# Patient Record
Sex: FEMALE | Race: WHITE | Employment: FULL TIME | URBAN - METROPOLITAN AREA
[De-identification: names, ages, dates, MRNs, and addresses within clinical notes are randomized per-mention and may not be internally consistent; named-entity substitution may affect disease eponyms.]

---

## 2017-01-06 ENCOUNTER — OFFICE VISIT (OUTPATIENT)
Dept: INTERNAL MEDICINE CLINIC | Age: 32
End: 2017-01-06

## 2017-01-06 VITALS
BODY MASS INDEX: 42.83 KG/M2 | SYSTOLIC BLOOD PRESSURE: 120 MMHG | WEIGHT: 290 LBS | TEMPERATURE: 98.4 F | DIASTOLIC BLOOD PRESSURE: 62 MMHG

## 2017-01-06 DIAGNOSIS — J06.9 VIRAL URI: ICD-10-CM

## 2017-01-06 PROCEDURE — 99213 OFFICE O/P EST LOW 20 MIN: CPT | Performed by: INTERNAL MEDICINE

## 2017-01-06 RX ORDER — DOXYCYCLINE HYCLATE 100 MG/1
100 CAPSULE ORAL 2 TIMES DAILY
Qty: 20 CAPSULE | Refills: 0 | Status: SHIPPED | OUTPATIENT
Start: 2017-01-06 | End: 2017-01-16

## 2017-03-28 ENCOUNTER — OFFICE VISIT (OUTPATIENT)
Dept: ORTHOPEDIC SURGERY | Age: 32
End: 2017-03-28

## 2017-03-28 VITALS — WEIGHT: 280 LBS | HEIGHT: 70 IN | BODY MASS INDEX: 40.09 KG/M2

## 2017-03-28 DIAGNOSIS — S86.892A SHIN SPLINTS, LEFT, INITIAL ENCOUNTER: ICD-10-CM

## 2017-03-28 DIAGNOSIS — M79.605 LEFT LEG PAIN: Primary | ICD-10-CM

## 2017-03-28 PROCEDURE — 99203 OFFICE O/P NEW LOW 30 MIN: CPT | Performed by: NURSE PRACTITIONER

## 2017-03-28 PROCEDURE — 73590 X-RAY EXAM OF LOWER LEG: CPT | Performed by: NURSE PRACTITIONER

## 2017-04-11 ENCOUNTER — OFFICE VISIT (OUTPATIENT)
Dept: ORTHOPEDIC SURGERY | Age: 32
End: 2017-04-11

## 2017-04-11 VITALS — HEART RATE: 62 BPM | HEIGHT: 70 IN | BODY MASS INDEX: 40.08 KG/M2 | WEIGHT: 279.98 LBS | RESPIRATION RATE: 17 BRPM

## 2017-04-11 DIAGNOSIS — S86.892A SHIN SPLINTS, LEFT, INITIAL ENCOUNTER: Primary | ICD-10-CM

## 2017-04-11 PROBLEM — S86.899A SHIN SPLINTS: Status: ACTIVE | Noted: 2017-04-11

## 2017-04-11 PROCEDURE — 99214 OFFICE O/P EST MOD 30 MIN: CPT | Performed by: ORTHOPAEDIC SURGERY

## 2017-04-13 ENCOUNTER — OFFICE VISIT (OUTPATIENT)
Dept: INTERNAL MEDICINE CLINIC | Age: 32
End: 2017-04-13

## 2017-04-13 VITALS
HEART RATE: 74 BPM | WEIGHT: 272 LBS | DIASTOLIC BLOOD PRESSURE: 69 MMHG | OXYGEN SATURATION: 99 % | BODY MASS INDEX: 38.94 KG/M2 | SYSTOLIC BLOOD PRESSURE: 126 MMHG

## 2017-04-13 DIAGNOSIS — E03.4 HYPOTHYROIDISM DUE TO ACQUIRED ATROPHY OF THYROID: ICD-10-CM

## 2017-04-13 DIAGNOSIS — E78.00 PURE HYPERCHOLESTEROLEMIA: ICD-10-CM

## 2017-04-13 DIAGNOSIS — L40.50 PSORIATIC ARTHRITIS (HCC): Chronic | ICD-10-CM

## 2017-04-13 DIAGNOSIS — J30.2 SEASONAL ALLERGIC RHINITIS, UNSPECIFIED ALLERGIC RHINITIS TRIGGER: Primary | ICD-10-CM

## 2017-04-13 DIAGNOSIS — E66.01 MORBID OBESITY WITH BMI OF 40.0-44.9, ADULT (HCC): ICD-10-CM

## 2017-04-13 DIAGNOSIS — K76.0 FATTY LIVER DISEASE, NONALCOHOLIC: ICD-10-CM

## 2017-04-13 PROBLEM — J06.9 VIRAL URI: Status: RESOLVED | Noted: 2017-01-06 | Resolved: 2017-04-13

## 2017-04-13 LAB
ALBUMIN SERPL-MCNC: 4.5 G/DL (ref 3.4–5)
ALP BLD-CCNC: 53 U/L (ref 40–129)
ALT SERPL-CCNC: 90 U/L (ref 10–40)
ANION GAP SERPL CALCULATED.3IONS-SCNC: 16 MMOL/L (ref 3–16)
AST SERPL-CCNC: 51 U/L (ref 15–37)
BASOPHILS ABSOLUTE: 0 K/UL (ref 0–0.2)
BASOPHILS RELATIVE PERCENT: 0.5 %
BILIRUB SERPL-MCNC: 0.7 MG/DL (ref 0–1)
BILIRUBIN DIRECT: <0.2 MG/DL (ref 0–0.3)
BILIRUBIN, INDIRECT: ABNORMAL MG/DL (ref 0–1)
BUN BLDV-MCNC: 13 MG/DL (ref 7–20)
CALCIUM SERPL-MCNC: 9.5 MG/DL (ref 8.3–10.6)
CHLORIDE BLD-SCNC: 99 MMOL/L (ref 99–110)
CHOLESTEROL, TOTAL: 205 MG/DL (ref 0–199)
CO2: 27 MMOL/L (ref 21–32)
CREAT SERPL-MCNC: 0.9 MG/DL (ref 0.6–1.1)
EOSINOPHILS ABSOLUTE: 0.1 K/UL (ref 0–0.6)
EOSINOPHILS RELATIVE PERCENT: 1.8 %
GFR AFRICAN AMERICAN: >60
GFR NON-AFRICAN AMERICAN: >60
GLUCOSE BLD-MCNC: 84 MG/DL (ref 70–99)
HCT VFR BLD CALC: 41.7 % (ref 36–48)
HDLC SERPL-MCNC: 37 MG/DL (ref 40–60)
HEMOGLOBIN: 14 G/DL (ref 12–16)
LDL CHOLESTEROL CALCULATED: 122 MG/DL
LYMPHOCYTES ABSOLUTE: 3.1 K/UL (ref 1–5.1)
LYMPHOCYTES RELATIVE PERCENT: 49.6 %
MCH RBC QN AUTO: 29.5 PG (ref 26–34)
MCHC RBC AUTO-ENTMCNC: 33.5 G/DL (ref 31–36)
MCV RBC AUTO: 88.1 FL (ref 80–100)
MONOCYTES ABSOLUTE: 0.5 K/UL (ref 0–1.3)
MONOCYTES RELATIVE PERCENT: 7.2 %
NEUTROPHILS ABSOLUTE: 2.6 K/UL (ref 1.7–7.7)
NEUTROPHILS RELATIVE PERCENT: 40.9 %
PDW BLD-RTO: 13.5 % (ref 12.4–15.4)
PLATELET # BLD: 205 K/UL (ref 135–450)
PMV BLD AUTO: 8.9 FL (ref 5–10.5)
POTASSIUM SERPL-SCNC: 4 MMOL/L (ref 3.5–5.1)
RBC # BLD: 4.73 M/UL (ref 4–5.2)
SODIUM BLD-SCNC: 142 MMOL/L (ref 136–145)
T4 FREE: 1.3 NG/DL (ref 0.9–1.8)
TOTAL PROTEIN: 7.4 G/DL (ref 6.4–8.2)
TRIGL SERPL-MCNC: 230 MG/DL (ref 0–150)
TSH SERPL DL<=0.05 MIU/L-ACNC: 4.55 UIU/ML (ref 0.27–4.2)
VLDLC SERPL CALC-MCNC: 46 MG/DL
WBC # BLD: 6.3 K/UL (ref 4–11)

## 2017-04-13 PROCEDURE — 99214 OFFICE O/P EST MOD 30 MIN: CPT | Performed by: INTERNAL MEDICINE

## 2017-04-13 RX ORDER — FEXOFENADINE HCL 180 MG/1
180 TABLET ORAL DAILY
COMMUNITY
Start: 2017-04-13

## 2017-04-18 RX ORDER — CLOBETASOL PROPIONATE 0.46 MG/ML
SOLUTION TOPICAL
Qty: 200 ML | Refills: 6 | Status: SHIPPED | OUTPATIENT
Start: 2017-04-18 | End: 2018-01-22 | Stop reason: SDUPTHER

## 2017-08-30 ENCOUNTER — OFFICE VISIT (OUTPATIENT)
Dept: INTERNAL MEDICINE CLINIC | Age: 32
End: 2017-08-30

## 2017-08-30 VITALS
TEMPERATURE: 98.4 F | SYSTOLIC BLOOD PRESSURE: 121 MMHG | OXYGEN SATURATION: 99 % | DIASTOLIC BLOOD PRESSURE: 72 MMHG | WEIGHT: 293 LBS | BODY MASS INDEX: 41.95 KG/M2 | HEART RATE: 80 BPM

## 2017-08-30 DIAGNOSIS — H66.002 ACUTE SUPPURATIVE OTITIS MEDIA OF LEFT EAR WITHOUT SPONTANEOUS RUPTURE OF TYMPANIC MEMBRANE, RECURRENCE NOT SPECIFIED: ICD-10-CM

## 2017-08-30 DIAGNOSIS — J01.00 ACUTE MAXILLARY SINUSITIS, RECURRENCE NOT SPECIFIED: Primary | ICD-10-CM

## 2017-08-30 PROCEDURE — 99213 OFFICE O/P EST LOW 20 MIN: CPT | Performed by: NURSE PRACTITIONER

## 2017-08-30 RX ORDER — CLARITHROMYCIN 500 MG/1
500 TABLET, COATED ORAL 2 TIMES DAILY
Qty: 20 TABLET | Refills: 0 | Status: SHIPPED | OUTPATIENT
Start: 2017-08-30 | End: 2017-09-09

## 2017-10-24 ENCOUNTER — OFFICE VISIT (OUTPATIENT)
Dept: INTERNAL MEDICINE CLINIC | Age: 32
End: 2017-10-24

## 2017-10-24 VITALS
DIASTOLIC BLOOD PRESSURE: 78 MMHG | HEART RATE: 90 BPM | SYSTOLIC BLOOD PRESSURE: 120 MMHG | OXYGEN SATURATION: 97 % | BODY MASS INDEX: 42.81 KG/M2 | WEIGHT: 293 LBS

## 2017-10-24 DIAGNOSIS — E03.4 HYPOTHYROIDISM DUE TO ACQUIRED ATROPHY OF THYROID: ICD-10-CM

## 2017-10-24 DIAGNOSIS — J30.2 CHRONIC SEASONAL ALLERGIC RHINITIS DUE TO OTHER ALLERGEN: ICD-10-CM

## 2017-10-24 DIAGNOSIS — E66.01 MORBID OBESITY WITH BMI OF 40.0-44.9, ADULT (HCC): ICD-10-CM

## 2017-10-24 DIAGNOSIS — K76.0 FATTY LIVER DISEASE, NONALCOHOLIC: ICD-10-CM

## 2017-10-24 DIAGNOSIS — E78.00 PURE HYPERCHOLESTEROLEMIA: Primary | ICD-10-CM

## 2017-10-24 DIAGNOSIS — L40.50 PSORIATIC ARTHRITIS (HCC): Chronic | ICD-10-CM

## 2017-10-24 PROBLEM — R79.89 ABNORMAL TSH: Status: ACTIVE | Noted: 2017-10-24

## 2017-10-24 PROCEDURE — 99214 OFFICE O/P EST MOD 30 MIN: CPT | Performed by: INTERNAL MEDICINE

## 2017-10-24 PROCEDURE — 90471 IMMUNIZATION ADMIN: CPT | Performed by: INTERNAL MEDICINE

## 2017-10-24 PROCEDURE — 90686 IIV4 VACC NO PRSV 0.5 ML IM: CPT | Performed by: INTERNAL MEDICINE

## 2017-10-24 RX ORDER — ALBUTEROL SULFATE 90 UG/1
2 AEROSOL, METERED RESPIRATORY (INHALATION) EVERY 4 HOURS PRN
Qty: 1 INHALER | Refills: 3 | Status: SHIPPED | OUTPATIENT
Start: 2017-10-24 | End: 2020-02-10 | Stop reason: SDUPTHER

## 2017-10-24 ASSESSMENT — PATIENT HEALTH QUESTIONNAIRE - PHQ9
SUM OF ALL RESPONSES TO PHQ QUESTIONS 1-9: 0
2. FEELING DOWN, DEPRESSED OR HOPELESS: 0
1. LITTLE INTEREST OR PLEASURE IN DOING THINGS: 0
SUM OF ALL RESPONSES TO PHQ9 QUESTIONS 1 & 2: 0

## 2017-10-24 NOTE — PROGRESS NOTES
Vaccine Information Sheet, \"Influenza - Inactivated\"  given to Jory Mcduffie, or parent/legal guardian of  Jory Mcduffie and verbalized understanding. Patient responses:    Have you ever had a reaction to a flu vaccine? No  Are you able to eat eggs without adverse effects? Yes  Do you have any current illness? No  Have you ever had [place] Syndrome? No    Flu vaccine given per order. Please see immunization tab.

## 2017-10-24 NOTE — PROGRESS NOTES
flares Yes Maged Case MD   Calcium Carb-Cholecalciferol (CALTRATE 600+D) 600-800 MG-UNIT TABS Take by mouth Yes Historical Provider, MD   fexofenadine (ALLEGRA ALLERGY) 180 MG tablet Take 1 tablet by mouth daily Yes Kanika Moy MD   clindamycin (CLEOCIN T) 1 % external solution Apply to affected area BID Yes Maged Case MD   clobetasol (TEMOVATE) 0.05 % cream Apply to affected area BID PRN flares Yes Maged Case MD   pimecrolimus (ELIDEL) 1 % cream Apply topically 2 times daily. Yes Maged Case MD   clobetasol (OLUX) 0.05 % foam Apply to the affected area BID PRN flares Yes Maged Case MD   Multiple Vitamin (MULTIVITAMINS PO) Take by mouth Yes Historical Provider, MD   etanercept (ENBREL) 50 MG/ML injection Inject 25 mg into the skin once Yes Historical Provider, MD   meloxicam (MOBIC) 15 MG tablet Take 15 mg by mouth daily. Yes Historical Provider, MD   pseudoephedrine (SUDAFED) 30 MG tablet Take 2 tablets by mouth every 6 hours as needed for Congestion. Yes Kanika Moy MD   montelukast (SINGULAIR) 10 MG tablet Take 1 tablet by mouth nightly  Kanika Moy MD   levothyroxine (SYNTHROID) 50 MCG tablet TAKE ONE TABLET BY MOUTH DAILY  Kanika Moy MD        Vitals:    10/24/17 1400   BP: 120/78   Pulse: 90   SpO2: 97%   Weight: 299 lb (135.6 kg)     Estimated body mass index is 42.81 kg/m² as calculated from the following:    Height as of 4/11/17: 5' 10.08\" (1.78 m). Weight as of this encounter: 299 lb (135.6 kg). Wt Readings from Last 3 Encounters:   10/24/17 299 lb (135.6 kg)   08/30/17 293 lb (132.9 kg)   04/13/17 272 lb (123.4 kg)     BP Readings from Last 3 Encounters:   10/24/17 120/78   08/30/17 121/72   04/13/17 126/69       CC:  Patient presents for follow-up of   1. Pure hypercholesterolemia    2. Hypothyroidism due to acquired atrophy of thyroid    3. Psoriatic arthritis ([facility] [address]    4. Fatty liver disease, nonalcoholic    5.  Morbid obesity with BMI of 40.0-44.9, positive edema, pale, no purulent exudate. Eyes: Conjunctivae are normal.   Neck: No thyroid mass and no thyromegaly present. Cardiovascular: Normal rate, regular rhythm, normal heart sounds, intact distal pulses and normal pulses. Exam reveals no gallop and no friction rub. No murmur heard. Pulmonary/Chest: Effort normal and breath sounds normal. No respiratory distress. She has no wheezes. She has no rhonchi. She has no rales. Musculoskeletal: She exhibits no edema. Lymphadenopathy:     She has no cervical adenopathy. Neurological: She is alert and oriented to person, place, and time. Skin: Skin is warm, dry and intact. No rash noted. No erythema. Psoriatic plaques largely resolved on arms and scalp. Psychiatric: She has a normal mood and affect.  Her speech is normal and behavior is normal. Judgment and thought content normal. Cognition and memory are normal.

## 2017-10-24 NOTE — PATIENT INSTRUCTIONS
Patient Education        Learning About the Mediterranean Diet  What is the [address]? The Mediterranean diet is a style of eating rather than a diet plan. It features foods eaten in [place], [place], [place] and [place], and other countries along the [facility]. It emphasizes eating foods like fish, fruits, vegetables, beans, high-fiber breads and whole grains, nuts, and olive oil. This style of eating includes limited red meat, cheese, and sweets. Why choose the Mediterranean diet? A Mediterranean-style diet may improve heart health. It contains more fat than other heart-healthy diets. But the fats are mainly from nuts, unsaturated oils (such as fish oils and olive oil), and certain nut or seed oils (such as canola, soybean, or flaxseed oil). These fats may help protect the heart and blood vessels. How can you get started on the Mediterranean diet? Here are some things you can do to switch to a more Mediterranean way of eating. What to eat  · Eat a variety of fruits and vegetables each day, such as grapes, blueberries, tomatoes, broccoli, peppers, figs, olives, spinach, eggplant, beans, lentils, and chickpeas. · Eat a variety of whole-grain foods each day, such as oats, brown rice, and whole wheat bread, pasta, and couscous. · Eat fish at least 2 times a week. Try tuna, salmon, mackerel, lake trout, herring, or sardines. · Eat moderate amounts of low-fat dairy products, such as milk, cheese, or yogurt. · Eat moderate amounts of poultry and eggs. · Choose healthy (unsaturated) fats, such as nuts, olive oil, and certain nut or seed oils like canola, soybean, and flaxseed. · Limit unhealthy (saturated) fats, such as butter, palm oil, and coconut oil. And limit fats found in animal products, such as meat and dairy products made with whole milk. Try to eat red meat only a few times a month in very small amounts. · Limit sweets and desserts to only a few times a week.  This includes sugar-sweetened drinks like soda. The Mediterranean diet may also include red wine with your meal1 glass each day for women and up to 2 glasses a day for men. Tips for eating at home  · Use herbs, spices, garlic, lemon zest, and citrus juice instead of salt to add flavor to foods. · Add avocado slices to your sandwich instead of orellana. · Have fish for lunch or dinner instead of red meat. Brush the fish with olive oil, and broil or grill it. · Sprinkle your salad with seeds or nuts instead of cheese. · Cook with olive or canola oil instead of butter or oils that are high in saturated fat. · Switch from 2% milk or whole milk to 1% or fat-free milk. · Dip raw vegetables in a vinaigrette dressing or hummus instead of dips made from mayonnaise or sour cream.  · Have a piece of fruit for dessert instead of a piece of cake. Try baked apples, or have some dried fruit. Tips for eating out  · Try broiled, grilled, baked, or poached fish instead of having it fried or breaded. · Ask your  to have your meals prepared with olive oil instead of butter. · Order dishes made with marinara sauce or sauces made from olive oil. Avoid sauces made from cream or mayonnaise. · Choose whole-grain breads, whole wheat pasta and pizza crust, brown rice, beans, and lentils. · Cut back on butter or margarine on bread. Instead, you can dip your bread in a small amount of olive oil. · Ask for a side salad or grilled vegetables instead of french fries or chips. Where can you learn more? Go to https://[facility]. org and sign in to your [employer] account. Enter [phone] in the [facility] box to learn more about \"Learning About the Mediterranean Diet. \"     If you do not have an account, please click on the \"Sign Up Now\" link. Current as of: December 29, 2016  Content Version: 11.3  © 1344-6914 [employer], [employer]. Care instructions adapted under license by [facility] ([facility]).  If you have questions about

## 2017-10-27 DIAGNOSIS — K76.0 FATTY LIVER DISEASE, NONALCOHOLIC: ICD-10-CM

## 2017-10-27 DIAGNOSIS — L40.50 PSORIATIC ARTHRITIS (HCC): Chronic | ICD-10-CM

## 2017-10-27 DIAGNOSIS — E78.00 PURE HYPERCHOLESTEROLEMIA: ICD-10-CM

## 2017-10-27 DIAGNOSIS — E03.4 HYPOTHYROIDISM DUE TO ACQUIRED ATROPHY OF THYROID: ICD-10-CM

## 2017-10-27 LAB
ALBUMIN SERPL-MCNC: 4.1 G/DL (ref 3.4–5)
ALP BLD-CCNC: 56 U/L (ref 40–129)
ALT SERPL-CCNC: 58 U/L (ref 10–40)
ANION GAP SERPL CALCULATED.3IONS-SCNC: 13 MMOL/L (ref 3–16)
AST SERPL-CCNC: 35 U/L (ref 15–37)
BASOPHILS ABSOLUTE: 0 K/UL (ref 0–0.2)
BASOPHILS RELATIVE PERCENT: 0.4 %
BILIRUB SERPL-MCNC: 0.4 MG/DL (ref 0–1)
BILIRUBIN DIRECT: <0.2 MG/DL (ref 0–0.3)
BILIRUBIN, INDIRECT: ABNORMAL MG/DL (ref 0–1)
BUN BLDV-MCNC: 11 MG/DL (ref 7–20)
CALCIUM SERPL-MCNC: 9.1 MG/DL (ref 8.3–10.6)
CHLORIDE BLD-SCNC: 100 MMOL/L (ref 99–110)
CHOLESTEROL, FASTING: 198 MG/DL (ref 0–199)
CO2: 26 MMOL/L (ref 21–32)
CREAT SERPL-MCNC: 0.6 MG/DL (ref 0.6–1.1)
EOSINOPHILS ABSOLUTE: 0.1 K/UL (ref 0–0.6)
EOSINOPHILS RELATIVE PERCENT: 2.3 %
GFR AFRICAN AMERICAN: >60
GFR NON-AFRICAN AMERICAN: >60
GLUCOSE FASTING: 90 MG/DL (ref 70–99)
HCT VFR BLD CALC: 42 % (ref 36–48)
HDLC SERPL-MCNC: 43 MG/DL (ref 40–60)
HEMOGLOBIN: 14 G/DL (ref 12–16)
LDL CHOLESTEROL CALCULATED: 124 MG/DL
LYMPHOCYTES ABSOLUTE: 2.1 K/UL (ref 1–5.1)
LYMPHOCYTES RELATIVE PERCENT: 36.2 %
MCH RBC QN AUTO: 29.4 PG (ref 26–34)
MCHC RBC AUTO-ENTMCNC: 33.4 G/DL (ref 31–36)
MCV RBC AUTO: 88.1 FL (ref 80–100)
MONOCYTES ABSOLUTE: 0.4 K/UL (ref 0–1.3)
MONOCYTES RELATIVE PERCENT: 7.9 %
NEUTROPHILS ABSOLUTE: 3 K/UL (ref 1.7–7.7)
NEUTROPHILS RELATIVE PERCENT: 53.2 %
PDW BLD-RTO: 13.5 % (ref 12.4–15.4)
PLATELET # BLD: 197 K/UL (ref 135–450)
PMV BLD AUTO: 8.4 FL (ref 5–10.5)
POTASSIUM SERPL-SCNC: 4.5 MMOL/L (ref 3.5–5.1)
RBC # BLD: 4.77 M/UL (ref 4–5.2)
SODIUM BLD-SCNC: 139 MMOL/L (ref 136–145)
TOTAL PROTEIN: 7.3 G/DL (ref 6.4–8.2)
TRIGLYCERIDE, FASTING: 154 MG/DL (ref 0–150)
TSH REFLEX: 2.13 UIU/ML (ref 0.27–4.2)
VLDLC SERPL CALC-MCNC: 31 MG/DL
WBC # BLD: 5.7 K/UL (ref 4–11)

## 2018-01-03 ENCOUNTER — OFFICE VISIT (OUTPATIENT)
Dept: DERMATOLOGY | Age: 33
End: 2018-01-03

## 2018-01-03 DIAGNOSIS — L98.0 PYOGENIC GRANULOMA: Primary | ICD-10-CM

## 2018-01-03 PROCEDURE — 11306 SHAVE SKIN LESION 0.6-1.0 CM: CPT | Performed by: DERMATOLOGY

## 2018-01-03 NOTE — PROGRESS NOTES
sent to pathology. Pt educated re: risk of bleeding, infection, scar and wound care instructions. Dressing applied.   Discussed high risk of recurrence-recheck in the next month

## 2018-01-09 ENCOUNTER — TELEPHONE (OUTPATIENT)
Dept: DERMATOLOGY | Age: 33
End: 2018-01-09

## 2018-01-22 ENCOUNTER — OFFICE VISIT (OUTPATIENT)
Dept: DERMATOLOGY | Age: 33
End: 2018-01-22

## 2018-01-22 DIAGNOSIS — L98.0 PYOGENIC GRANULOMA: Primary | ICD-10-CM

## 2018-01-22 PROCEDURE — 11305 SHAVE SKIN LESION 0.5 CM/<: CPT | Performed by: DERMATOLOGY

## 2018-01-23 RX ORDER — CLOBETASOL PROPIONATE 0.46 MG/ML
SOLUTION TOPICAL
Qty: 100 ML | Refills: 5 | Status: SHIPPED | OUTPATIENT
Start: 2018-01-23

## 2018-01-23 RX ORDER — CLOBETASOL PROPIONATE 0.5 MG/G
AEROSOL, FOAM TOPICAL
Qty: 100 G | Refills: 4 | Status: SHIPPED | OUTPATIENT
Start: 2018-01-23

## 2018-01-25 ENCOUNTER — TELEPHONE (OUTPATIENT)
Dept: DERMATOLOGY | Age: 33
End: 2018-01-25

## 2018-02-12 ENCOUNTER — OFFICE VISIT (OUTPATIENT)
Dept: DERMATOLOGY | Age: 33
End: 2018-02-12

## 2018-02-12 DIAGNOSIS — L98.0 PYOGENIC GRANULOMA: Primary | ICD-10-CM

## 2018-02-12 PROCEDURE — 11306 SHAVE SKIN LESION 0.6-1.0 CM: CPT | Performed by: DERMATOLOGY

## 2018-02-12 NOTE — PROGRESS NOTES
[address] [place] [address] Dermatology  Eustaquio Lombard, M.D.  [phone]       Dipika Marshall  1985    27 y.o. female     Date of Visit: 2/12/2018    Chief Complaint:   Chief Complaint   Patient presents with    Skin Lesion     left hand index finger        I was asked to see this patient by Dr. Carter ref. provider found. History of Present Illness:  1. Patient presented today for follow-up of a pyogenic granuloma left index finger, palmar surface. Lesion is been removed twice previously. Lesion much improved but she notices a few areas of erythema and that it remains papular. She notes a high co-pay every time she comes to the office. Review of Systems:  Constitutional: Reports general sense of well-being       Past Medical History, Surgical History, Family History, Medications and Allergies reviewed. Social History:   Social History     Social History    Marital status: Single     Spouse name: N/A    Number of children: 0    Years of education: N/A     Occupational History    Student      Social History Main Topics    Smoking status: Never Smoker    Smokeless tobacco: Never Used    Alcohol use 2.4 oz/week     2 Glasses of wine, 2 Shots of liquor per week    Drug use: No    Sexual activity: Not Currently     Other Topics Concern    Not on file     Social History Narrative    No narrative on file       Physical Examination       -General: Well-appearing, NAD  7 mm raised slightly scaly erythematous papule left palmar surface of index finger          Assessment and Plan     1. Pyogenic granuloma -Left palmar surface of index finger -Verbal consent obtained after risks (infection, bleeding, scar), benefits and alternatives explained. -Area(s) to be shaved were marked with a surgical pen. Site(s) were cleansed with alcohol. Local anesthesia achieved with 1% lidocaine with epinephrine/sodium bicarbonate. Shave lesion performed with a razor blade.  Hemostasis was achieved with aluminum chloride and

## 2018-02-15 ENCOUNTER — TELEPHONE (OUTPATIENT)
Dept: DERMATOLOGY | Age: 33
End: 2018-02-15

## 2018-02-15 NOTE — TELEPHONE ENCOUNTER
Called and LM on  with pathology results. (OK per HIPAA)      Date of biopsy: 02/12/2018  Site of biopsy: L index finger  Result: Pyogenic granuloma    Plan: No further treatment needed.

## 2018-03-12 ENCOUNTER — TELEPHONE (OUTPATIENT)
Dept: DERMATOLOGY | Age: 33
End: 2018-03-12

## 2018-03-12 NOTE — TELEPHONE ENCOUNTER
Pt calling states she is still having some issues with her hands wants to know if  will refer her to hand surgeon pls call pt back to discuss @ [phone] thank you

## 2018-04-24 ENCOUNTER — OFFICE VISIT (OUTPATIENT)
Dept: INTERNAL MEDICINE CLINIC | Age: 33
End: 2018-04-24

## 2018-04-24 VITALS
DIASTOLIC BLOOD PRESSURE: 78 MMHG | SYSTOLIC BLOOD PRESSURE: 126 MMHG | HEIGHT: 70 IN | WEIGHT: 293 LBS | BODY MASS INDEX: 41.95 KG/M2 | HEART RATE: 80 BPM

## 2018-04-24 DIAGNOSIS — K76.0 FATTY LIVER DISEASE, NONALCOHOLIC: ICD-10-CM

## 2018-04-24 DIAGNOSIS — L40.50 PSORIATIC ARTHRITIS (HCC): Chronic | ICD-10-CM

## 2018-04-24 DIAGNOSIS — J30.2 CHRONIC SEASONAL ALLERGIC RHINITIS DUE TO OTHER ALLERGEN: ICD-10-CM

## 2018-04-24 DIAGNOSIS — E03.4 HYPOTHYROIDISM DUE TO ACQUIRED ATROPHY OF THYROID: ICD-10-CM

## 2018-04-24 DIAGNOSIS — E66.01 MORBID OBESITY WITH BMI OF 40.0-44.9, ADULT (HCC): ICD-10-CM

## 2018-04-24 DIAGNOSIS — E78.00 PURE HYPERCHOLESTEROLEMIA: Primary | ICD-10-CM

## 2018-04-24 PROCEDURE — 99214 OFFICE O/P EST MOD 30 MIN: CPT | Performed by: INTERNAL MEDICINE

## 2018-06-20 RX ORDER — CLINDAMYCIN PHOSPHATE 11.9 MG/ML
SOLUTION TOPICAL
Qty: 60 ML | Refills: 5 | Status: SHIPPED | OUTPATIENT
Start: 2018-06-20

## 2018-07-23 ENCOUNTER — PATIENT MESSAGE (OUTPATIENT)
Dept: INTERNAL MEDICINE CLINIC | Age: 33
End: 2018-07-23

## 2018-08-29 ENCOUNTER — APPOINTMENT (OUTPATIENT)
Dept: CT IMAGING | Age: 33
End: 2018-08-29
Payer: COMMERCIAL

## 2018-08-29 ENCOUNTER — HOSPITAL ENCOUNTER (EMERGENCY)
Age: 33
Discharge: HOME | End: 2018-08-29
Attending: EMERGENCY MEDICINE
Payer: COMMERCIAL

## 2018-08-29 VITALS
OXYGEN SATURATION: 100 % | TEMPERATURE: 98.5 F | HEART RATE: 61 BPM | BODY MASS INDEX: 43.4 KG/M2 | RESPIRATION RATE: 16 BRPM | SYSTOLIC BLOOD PRESSURE: 139 MMHG | HEIGHT: 69 IN | WEIGHT: 293 LBS | DIASTOLIC BLOOD PRESSURE: 83 MMHG

## 2018-08-29 DIAGNOSIS — R10.12 LEFT UPPER QUADRANT PAIN: Primary | ICD-10-CM

## 2018-08-29 LAB
A/G RATIO: 1.5 (ref 1.1–2.2)
ALBUMIN SERPL-MCNC: 4.4 G/DL (ref 3.4–5)
ALP BLD-CCNC: 52 U/L (ref 40–129)
ALT SERPL-CCNC: 69 U/L (ref 10–40)
ANION GAP SERPL CALCULATED.3IONS-SCNC: 11 MMOL/L (ref 3–16)
AST SERPL-CCNC: 43 U/L (ref 15–37)
BACTERIA: ABNORMAL /HPF
BASOPHILS ABSOLUTE: 0 K/UL (ref 0–0.2)
BASOPHILS RELATIVE PERCENT: 0.5 %
BILIRUB SERPL-MCNC: 0.5 MG/DL (ref 0–1)
BILIRUBIN URINE: NEGATIVE
BLOOD, URINE: ABNORMAL
BUN BLDV-MCNC: 13 MG/DL (ref 7–20)
CALCIUM SERPL-MCNC: 9.2 MG/DL (ref 8.3–10.6)
CHLORIDE BLD-SCNC: 103 MMOL/L (ref 99–110)
CLARITY: CLEAR
CO2: 25 MMOL/L (ref 21–32)
COLOR: YELLOW
CREAT SERPL-MCNC: 0.7 MG/DL (ref 0.6–1.1)
EOSINOPHILS ABSOLUTE: 0.1 K/UL (ref 0–0.6)
EOSINOPHILS RELATIVE PERCENT: 2.2 %
EPITHELIAL CELLS, UA: ABNORMAL /HPF
GFR AFRICAN AMERICAN: >60
GFR NON-AFRICAN AMERICAN: >60
GLOBULIN: 2.9 G/DL
GLUCOSE BLD-MCNC: 93 MG/DL (ref 70–99)
GLUCOSE URINE: NEGATIVE MG/DL
HCT VFR BLD CALC: 39.7 % (ref 36–48)
HEMOGLOBIN: 13.7 G/DL (ref 12–16)
KETONES, URINE: NEGATIVE MG/DL
LEUKOCYTE ESTERASE, URINE: NEGATIVE
LIPASE: 27 U/L (ref 13–60)
LYMPHOCYTES ABSOLUTE: 2.2 K/UL (ref 1–5.1)
LYMPHOCYTES RELATIVE PERCENT: 41.7 %
MCH RBC QN AUTO: 29.5 PG (ref 26–34)
MCHC RBC AUTO-ENTMCNC: 34.4 G/DL (ref 31–36)
MCV RBC AUTO: 86 FL (ref 80–100)
MICROSCOPIC EXAMINATION: YES
MONOCYTES ABSOLUTE: 0.4 K/UL (ref 0–1.3)
MONOCYTES RELATIVE PERCENT: 7.2 %
NEUTROPHILS ABSOLUTE: 2.6 K/UL (ref 1.7–7.7)
NEUTROPHILS RELATIVE PERCENT: 48.4 %
NITRITE, URINE: NEGATIVE
PDW BLD-RTO: 13.5 % (ref 12.4–15.4)
PH UA: 6
PLATELET # BLD: 196 K/UL (ref 135–450)
PMV BLD AUTO: 7.8 FL (ref 5–10.5)
POTASSIUM REFLEX MAGNESIUM: 3.9 MMOL/L (ref 3.5–5.1)
PREGNANCY, URINE: NEGATIVE
PROTEIN UA: NEGATIVE MG/DL
RBC # BLD: 4.62 M/UL (ref 4–5.2)
RBC UA: ABNORMAL /HPF (ref 0–2)
SODIUM BLD-SCNC: 139 MMOL/L (ref 136–145)
SPECIFIC GRAVITY UA: >=1.03
TOTAL PROTEIN: 7.3 G/DL (ref 6.4–8.2)
URINE TYPE: ABNORMAL
UROBILINOGEN, URINE: 0.2 E.U./DL
WBC # BLD: 5.3 K/UL (ref 4–11)
WBC UA: ABNORMAL /HPF (ref 0–5)

## 2018-08-29 PROCEDURE — 6360000002 HC RX W HCPCS: Performed by: PHYSICIAN ASSISTANT

## 2018-08-29 PROCEDURE — 84703 CHORIONIC GONADOTROPIN ASSAY: CPT

## 2018-08-29 PROCEDURE — 74177 CT ABD & PELVIS W/CONTRAST: CPT

## 2018-08-29 PROCEDURE — 85025 COMPLETE CBC W/AUTO DIFF WBC: CPT

## 2018-08-29 PROCEDURE — 81001 URINALYSIS AUTO W/SCOPE: CPT

## 2018-08-29 PROCEDURE — 96375 TX/PRO/DX INJ NEW DRUG ADDON: CPT

## 2018-08-29 PROCEDURE — 6360000004 HC RX CONTRAST MEDICATION: Performed by: EMERGENCY MEDICINE

## 2018-08-29 PROCEDURE — 6360000002 HC RX W HCPCS: Performed by: EMERGENCY MEDICINE

## 2018-08-29 PROCEDURE — 96374 THER/PROPH/DIAG INJ IV PUSH: CPT

## 2018-08-29 PROCEDURE — 80053 COMPREHEN METABOLIC PANEL: CPT

## 2018-08-29 PROCEDURE — 99284 EMERGENCY DEPT VISIT MOD MDM: CPT

## 2018-08-29 PROCEDURE — 83690 ASSAY OF LIPASE: CPT

## 2018-08-29 RX ORDER — ONDANSETRON 2 MG/ML
4 INJECTION INTRAMUSCULAR; INTRAVENOUS ONCE
Status: COMPLETED | OUTPATIENT
Start: 2018-08-29 | End: 2018-08-29

## 2018-08-29 RX ORDER — OXYCODONE HYDROCHLORIDE 5 MG/1
5 TABLET ORAL ONCE
Status: DISCONTINUED | OUTPATIENT
Start: 2018-08-29 | End: 2018-08-29 | Stop reason: HOSPADM

## 2018-08-29 RX ORDER — KETOROLAC TROMETHAMINE 30 MG/ML
15 INJECTION, SOLUTION INTRAMUSCULAR; INTRAVENOUS ONCE
Status: COMPLETED | OUTPATIENT
Start: 2018-08-29 | End: 2018-08-29

## 2018-08-29 RX ADMIN — KETOROLAC TROMETHAMINE 15 MG: 30 INJECTION, SOLUTION INTRAMUSCULAR at 18:35

## 2018-08-29 RX ADMIN — IOPAMIDOL 80 ML: 755 INJECTION, SOLUTION INTRAVENOUS at 18:52

## 2018-08-29 RX ADMIN — ONDANSETRON 4 MG: 2 INJECTION INTRAMUSCULAR; INTRAVENOUS at 18:35

## 2018-08-29 ASSESSMENT — ENCOUNTER SYMPTOMS
ABDOMINAL PAIN: 1
WHEEZING: 0
NAUSEA: 1
SHORTNESS OF BREATH: 0
COUGH: 0
VOMITING: 0
SORE THROAT: 0

## 2018-08-29 ASSESSMENT — PAIN SCALES - GENERAL
PAINLEVEL_OUTOF10: 4
PAINLEVEL_OUTOF10: 7

## 2018-08-29 ASSESSMENT — PAIN DESCRIPTION - LOCATION: LOCATION: ABDOMEN

## 2018-08-29 ASSESSMENT — PAIN DESCRIPTION - ORIENTATION: ORIENTATION: LEFT;UPPER

## 2018-08-29 ASSESSMENT — PAIN DESCRIPTION - PAIN TYPE: TYPE: ACUTE PAIN

## 2018-08-29 ASSESSMENT — PAIN DESCRIPTION - DESCRIPTORS: DESCRIPTORS: ACHING;CONSTANT

## 2018-08-29 NOTE — ED PROVIDER NOTES
ED Attending Attestation Note     Date of evaluation: 8/29/2018    This patient was seen by the advance practice provider. I have seen and examined the patient, agree with the workup, evaluation, management and diagnosis. The care plan has been discussed. My assessment reveals here with left sided abd pain associated with nausea. Denies any fever chills or sweats. Denies any vomiting or diarrhea. On exam mild tenderness left abdomen at the level umbilicus but no true rebound or guarding.      Mina Corbett MD  08/29/18 0497

## 2018-08-29 NOTE — ED PROVIDER NOTES
[address] ENCOUNTER          PHYSICIAN ASSISTANT NOTE       Date of evaluation: 8/29/2018    Chief Complaint     Abdominal Pain (left upper abd pain/pt reports it gets worse after she eats/ +N)      History of Present Illness     Eladia Seay is a 26 y.o. female with a history of polycystic ovarian syndrome and obesity presents today with 4 days of episodic left upper quadrant abdominal pain. Patient describes a sharp burning pain in her left upper quadrant. At his current moment is 3/10 but at its worst at 7/10. It worsens with meals and specific movements of her trunk. She had 3 alcoholic beverages the evening before her pain started. She denies any new medication changes besides her recent fiber supplement. She endorses a single bout of nausea but has not vomited. No changes in her bowel habits. Last menstrual appears approximately 4 days ago and was normal for her. No shortness of breath, cough, or hemoptysis. Review of Systems     Review of Systems   Constitutional: Negative for chills and fever. HENT: Negative for sore throat. Eyes: Negative for visual disturbance. Respiratory: Negative for cough, shortness of breath and wheezing. Cardiovascular: Negative for chest pain and palpitations. Gastrointestinal: Positive for abdominal pain and nausea. Negative for vomiting. Musculoskeletal: Negative for gait problem. Skin: Negative for rash. Neurological: Negative for dizziness and headaches. Past Medical, Surgical, Family, and Social History     She has a past medical history of Depression; Dysmenorrhea; Herniated lumbar intervertebral disc; Hormone disorder; Psoriatic arthritis ([address] Seasonal allergic rhinitis; and Thyroid disease. She has a past surgical history that includes [place] tooth extraction and Foot neuroma surgery (2007). Her family history includes Coronary Art Dis in her maternal grandfather; Diabetes in her sister;  No Known Problems in her brother, father, maternal aunt, maternal grandmother, maternal uncle, mother, paternal aunt, paternal grandfather, paternal grandmother, and another family member; Other in her paternal uncle. She reports that she has never smoked. She has never used smokeless tobacco. She reports that she drinks about 2.4 oz of alcohol per week . She reports that she does not use drugs. Medications     Discharge Medication List as of 8/29/2018  7:46 PM      CONTINUE these medications which have NOT CHANGED    Details   clindamycin (CLEOCIN T) 1 % external solution Apply to affected area BID, Disp-60 mL, R-5, Normal      clobetasol (OLUX) 0.05 % foam APPLY TO AFFECTED AREA TWO TIMES A DAY AS NEEDED FOR FLARES, Disp-100 g, R-4, Normal      clobetasol (TEMOVATE) 0.05 % external solution APPLY TO THE AFFECTED AREA TWO TIMES A DAY AS NEEDED FOR FLARES, Disp-100 mL, R-5, Normal      albuterol sulfate HFA (PROAIR HFA) 108 (90 Base) MCG/ACT inhaler Inhale 2 puffs into the lungs every 4 hours as needed for Wheezing, Disp-1 Inhaler, R-3Normal      fluticasone (FLONASE SENSIMIST) 27.5 MCG/SPRAY nasal spray 2 sprays by Nasal route dailyHistorical Med      Calcium Carb-Cholecalciferol (CALTRATE 600+D) 600-800 MG-UNIT TABS Take by mouthHistorical Med      fexofenadine (ALLEGRA ALLERGY) 180 MG tablet Take 1 tablet by mouth dailyOTC      clobetasol (TEMOVATE) 0.05 % cream Apply to affected area BID PRN flares, Disp-60 g, R-6, Normal      pimecrolimus (ELIDEL) 1 % cream Apply topically 2 times daily. , Disp-1 Bottle, R-6, Normal      levothyroxine (SYNTHROID) 50 MCG tablet TAKE ONE TABLET BY MOUTH DAILY, Disp-30 tablet, R-5      Multiple Vitamin (MULTIVITAMINS PO) Take by mouth      etanercept (ENBREL) 50 MG/ML injection Inject 25 mg into the skin once      meloxicam (MOBIC) 15 MG tablet Take 15 mg by mouth daily. pseudoephedrine (SUDAFED) 30 MG tablet Take 2 tablets by mouth every 6 hours as needed for Congestion.

## 2018-09-11 ENCOUNTER — OFFICE VISIT (OUTPATIENT)
Dept: INTERNAL MEDICINE CLINIC | Age: 33
End: 2018-09-11

## 2018-09-11 VITALS
DIASTOLIC BLOOD PRESSURE: 74 MMHG | SYSTOLIC BLOOD PRESSURE: 122 MMHG | BODY MASS INDEX: 45.19 KG/M2 | WEIGHT: 293 LBS | TEMPERATURE: 98.8 F

## 2018-09-11 DIAGNOSIS — R35.0 URINARY FREQUENCY: Primary | ICD-10-CM

## 2018-09-11 DIAGNOSIS — R10.13 EPIGASTRIC PAIN: ICD-10-CM

## 2018-09-11 LAB
BILIRUBIN, POC: ABNORMAL
BLOOD URINE, POC: ABNORMAL
CLARITY, POC: CLEAR
COLOR, POC: CLEAR
GLUCOSE URINE, POC: ABNORMAL
KETONES, POC: ABNORMAL
LEUKOCYTE EST, POC: ABNORMAL
NITRITE, POC: ABNORMAL
PH, POC: 6
PROTEIN, POC: ABNORMAL
SPECIFIC GRAVITY, POC: 1.02
UROBILINOGEN, POC: ABNORMAL

## 2018-09-11 PROCEDURE — 99214 OFFICE O/P EST MOD 30 MIN: CPT | Performed by: INTERNAL MEDICINE

## 2018-09-11 PROCEDURE — 81002 URINALYSIS NONAUTO W/O SCOPE: CPT | Performed by: INTERNAL MEDICINE

## 2018-09-11 RX ORDER — PHENAZOPYRIDINE HYDROCHLORIDE 200 MG/1
200 TABLET, FILM COATED ORAL 3 TIMES DAILY PRN
Qty: 15 TABLET | Refills: 0 | Status: SHIPPED | OUTPATIENT
Start: 2018-09-11 | End: 2018-09-14

## 2018-09-13 LAB — URINE CULTURE, ROUTINE: NORMAL

## 2018-10-01 ENCOUNTER — TELEPHONE (OUTPATIENT)
Dept: INTERNAL MEDICINE CLINIC | Age: 33
End: 2018-10-01

## 2018-10-05 ENCOUNTER — OFFICE VISIT (OUTPATIENT)
Dept: INTERNAL MEDICINE CLINIC | Age: 33
End: 2018-10-05
Payer: COMMERCIAL

## 2018-10-05 VITALS
WEIGHT: 293 LBS | HEART RATE: 64 BPM | RESPIRATION RATE: 16 BRPM | SYSTOLIC BLOOD PRESSURE: 120 MMHG | TEMPERATURE: 97.6 F | DIASTOLIC BLOOD PRESSURE: 60 MMHG | BODY MASS INDEX: 43.71 KG/M2

## 2018-10-05 DIAGNOSIS — L40.50 PSORIATIC ARTHRITIS (HCC): Chronic | ICD-10-CM

## 2018-10-05 DIAGNOSIS — R53.83 FATIGUE, UNSPECIFIED TYPE: ICD-10-CM

## 2018-10-05 DIAGNOSIS — R68.83 CHILLS: ICD-10-CM

## 2018-10-05 DIAGNOSIS — J02.8 PHARYNGITIS DUE TO OTHER ORGANISM: ICD-10-CM

## 2018-10-05 DIAGNOSIS — R10.13 EPIGASTRIC PAIN: Primary | ICD-10-CM

## 2018-10-05 DIAGNOSIS — R10.13 EPIGASTRIC PAIN: ICD-10-CM

## 2018-10-05 LAB
A/G RATIO: 1.6 (ref 1.1–2.2)
ALBUMIN SERPL-MCNC: 4.5 G/DL (ref 3.4–5)
ALP BLD-CCNC: 65 U/L (ref 40–129)
ALT SERPL-CCNC: 72 U/L (ref 10–40)
ANION GAP SERPL CALCULATED.3IONS-SCNC: 13 MMOL/L (ref 3–16)
AST SERPL-CCNC: 46 U/L (ref 15–37)
BASOPHILS ABSOLUTE: 0 K/UL (ref 0–0.2)
BASOPHILS RELATIVE PERCENT: 0.6 %
BILIRUB SERPL-MCNC: 0.6 MG/DL (ref 0–1)
BUN BLDV-MCNC: 9 MG/DL (ref 7–20)
C-REACTIVE PROTEIN: 0.5 MG/L (ref 0–5.1)
CALCIUM SERPL-MCNC: 9.4 MG/DL (ref 8.3–10.6)
CHLORIDE BLD-SCNC: 100 MMOL/L (ref 99–110)
CO2: 25 MMOL/L (ref 21–32)
CREAT SERPL-MCNC: 0.8 MG/DL (ref 0.6–1.1)
EOSINOPHILS ABSOLUTE: 0.1 K/UL (ref 0–0.6)
EOSINOPHILS RELATIVE PERCENT: 2 %
GFR AFRICAN AMERICAN: >60
GFR NON-AFRICAN AMERICAN: >60
GLOBULIN: 2.9 G/DL
GLUCOSE BLD-MCNC: 84 MG/DL (ref 70–99)
HCT VFR BLD CALC: 43.3 % (ref 36–48)
HEMOGLOBIN: 14.5 G/DL (ref 12–16)
LYMPHOCYTES ABSOLUTE: 2.1 K/UL (ref 1–5.1)
LYMPHOCYTES RELATIVE PERCENT: 42.6 %
MCH RBC QN AUTO: 29.6 PG (ref 26–34)
MCHC RBC AUTO-ENTMCNC: 33.6 G/DL (ref 31–36)
MCV RBC AUTO: 88.1 FL (ref 80–100)
MONOCYTES ABSOLUTE: 0.3 K/UL (ref 0–1.3)
MONOCYTES RELATIVE PERCENT: 6.1 %
NEUTROPHILS ABSOLUTE: 2.5 K/UL (ref 1.7–7.7)
NEUTROPHILS RELATIVE PERCENT: 48.7 %
PDW BLD-RTO: 13.4 % (ref 12.4–15.4)
PLATELET # BLD: 211 K/UL (ref 135–450)
PMV BLD AUTO: 9.6 FL (ref 5–10.5)
POTASSIUM SERPL-SCNC: 4.4 MMOL/L (ref 3.5–5.1)
RBC # BLD: 4.92 M/UL (ref 4–5.2)
SEDIMENTATION RATE, ERYTHROCYTE: 9 MM/HR (ref 0–20)
SODIUM BLD-SCNC: 138 MMOL/L (ref 136–145)
TOTAL PROTEIN: 7.4 G/DL (ref 6.4–8.2)
WBC # BLD: 5 K/UL (ref 4–11)

## 2018-10-05 PROCEDURE — 99214 OFFICE O/P EST MOD 30 MIN: CPT | Performed by: INTERNAL MEDICINE

## 2018-10-05 ASSESSMENT — PATIENT HEALTH QUESTIONNAIRE - PHQ9
1. LITTLE INTEREST OR PLEASURE IN DOING THINGS: 0
SUM OF ALL RESPONSES TO PHQ9 QUESTIONS 1 & 2: 0
SUM OF ALL RESPONSES TO PHQ QUESTIONS 1-9: 0
2. FEELING DOWN, DEPRESSED OR HOPELESS: 0
SUM OF ALL RESPONSES TO PHQ QUESTIONS 1-9: 0

## 2018-10-05 NOTE — PROGRESS NOTES
HFA) 108 (90 Base) MCG/ACT inhaler Inhale 2 puffs into the lungs every 4 hours as needed for Wheezing 1 Inhaler 3    fluticasone (FLONASE SENSIMIST) 27.5 MCG/SPRAY nasal spray 2 sprays by Nasal route daily      fexofenadine (ALLEGRA ALLERGY) 180 MG tablet Take 1 tablet by mouth daily      clobetasol (TEMOVATE) 0.05 % cream Apply to affected area BID PRN flares 60 g 6    pimecrolimus (ELIDEL) 1 % cream Apply topically 2 times daily. 1 Bottle 6    Multiple Vitamin (MULTIVITAMINS PO) Take by mouth      etanercept (ENBREL) 50 MG/ML injection Inject 25 mg into the skin once      meloxicam (MOBIC) 15 MG tablet Take 15 mg by mouth daily.  Calcium Carb-Cholecalciferol (CALTRATE 600+D) 600-800 MG-UNIT TABS Take by mouth      pseudoephedrine (SUDAFED) 30 MG tablet Take 2 tablets by mouth every 6 hours as needed for Congestion. No current facility-administered medications on file prior to visit.         Past Medical History:   Diagnosis Date    Depression     Dysmenorrhea     Herniated lumbar intervertebral disc     L3, L5-S1 with impingement    Hormone disorder     P[facility]iatic arthritis ([facility] [address]     Seasonal allergic rhinitis 10/13/2016    Thyroid disease        Past Surgical History:   Procedure Laterality Date    FOOT NEUROMA SURGERY  2007    Right  x2    WISDOM TOOTH EXTRACTION         Social History   Substance Use Topics    Smoking status: Never Smoker    Smokeless tobacco: Never Used    Alcohol use 2.4 oz/week     2 Glasses of wine, 2 Shots of liquor per week      Comment: social       Family History   Problem Relation Age of Onset    Diabetes Sister         Type I    Coronary Art Dis Maternal Grandfather         MI- 66's    Other Paternal Uncle         Aortic dissection, possible Marfan's    No Known Problems Mother     No Known Problems Father     No Known Problems Brother     No Known Problems Maternal Aunt     No Known Problems Maternal Uncle     No Known Problems Paternal Aunt    

## 2018-10-05 NOTE — LETTER
June Jang DO  [id number] [facility], [address]  Ph: [phone]    Re:     Carmelita Arriaga     To whom this may concern, please note that Carmelita Arriaga was evaluated in my office 10/5/2018. Please excuse the patient from work/school from 10/5/2018 to 10/5/2018. Any questions please feel free to call my office. Thank you,         Curry Mauricio.  Nannette Llanes DO

## 2018-10-06 LAB
ESTIMATED AVERAGE GLUCOSE: 99.7 MG/DL
HBA1C MFR BLD: 5.1 %

## 2018-10-09 ENCOUNTER — TELEPHONE (OUTPATIENT)
Dept: INTERNAL MEDICINE CLINIC | Age: 33
End: 2018-10-09

## 2018-10-09 ENCOUNTER — APPOINTMENT (OUTPATIENT)
Dept: GENERAL RADIOLOGY | Age: 33
End: 2018-10-09
Payer: COMMERCIAL

## 2018-10-09 ENCOUNTER — HOSPITAL ENCOUNTER (EMERGENCY)
Age: 33
Discharge: HOME | End: 2018-10-09
Attending: EMERGENCY MEDICINE
Payer: COMMERCIAL

## 2018-10-09 ENCOUNTER — HOSPITAL ENCOUNTER (OUTPATIENT)
Dept: ULTRASOUND IMAGING | Age: 33
Discharge: HOME | End: 2018-10-09
Payer: COMMERCIAL

## 2018-10-09 VITALS
OXYGEN SATURATION: 95 % | HEART RATE: 76 BPM | HEIGHT: 69 IN | SYSTOLIC BLOOD PRESSURE: 103 MMHG | WEIGHT: 293 LBS | BODY MASS INDEX: 43.4 KG/M2 | DIASTOLIC BLOOD PRESSURE: 66 MMHG | RESPIRATION RATE: 22 BRPM | TEMPERATURE: 98.3 F

## 2018-10-09 DIAGNOSIS — R42 DIZZINESS: ICD-10-CM

## 2018-10-09 DIAGNOSIS — R10.84 GENERALIZED ABDOMINAL PAIN: Primary | ICD-10-CM

## 2018-10-09 DIAGNOSIS — R10.30 LOWER ABDOMINAL PAIN: Primary | ICD-10-CM

## 2018-10-09 DIAGNOSIS — R10.13 EPIGASTRIC PAIN: ICD-10-CM

## 2018-10-09 LAB
ALBUMIN SERPL-MCNC: 4.6 G/DL (ref 3.4–5)
ALP BLD-CCNC: 59 U/L (ref 40–129)
ALT SERPL-CCNC: 73 U/L (ref 10–40)
ANION GAP SERPL CALCULATED.3IONS-SCNC: 13 MMOL/L (ref 3–16)
AST SERPL-CCNC: 51 U/L (ref 15–37)
BACTERIA: ABNORMAL /HPF
BASOPHILS ABSOLUTE: 0 K/UL (ref 0–0.2)
BASOPHILS RELATIVE PERCENT: 0.4 %
BILIRUB SERPL-MCNC: 0.5 MG/DL (ref 0–1)
BILIRUBIN DIRECT: <0.2 MG/DL (ref 0–0.3)
BILIRUBIN URINE: NEGATIVE
BILIRUBIN, INDIRECT: ABNORMAL MG/DL (ref 0–1)
BLOOD, URINE: ABNORMAL
BUN BLDV-MCNC: 10 MG/DL (ref 7–20)
CALCIUM SERPL-MCNC: 9.5 MG/DL (ref 8.3–10.6)
CHLORIDE BLD-SCNC: 101 MMOL/L (ref 99–110)
CLARITY: CLEAR
CO2: 25 MMOL/L (ref 21–32)
COLOR: YELLOW
CREAT SERPL-MCNC: 0.8 MG/DL (ref 0.6–1.1)
EOSINOPHILS ABSOLUTE: 0.1 K/UL (ref 0–0.6)
EOSINOPHILS RELATIVE PERCENT: 1.9 %
EPITHELIAL CELLS, UA: ABNORMAL /HPF
GFR AFRICAN AMERICAN: >60
GFR NON-AFRICAN AMERICAN: >60
GLUCOSE BLD-MCNC: 98 MG/DL (ref 70–99)
GLUCOSE URINE: NEGATIVE MG/DL
HCG QUALITATIVE: NEGATIVE
HCT VFR BLD CALC: 41.4 % (ref 36–48)
HEMOGLOBIN: 14.2 G/DL (ref 12–16)
KETONES, URINE: NEGATIVE MG/DL
LEUKOCYTE ESTERASE, URINE: NEGATIVE
LIPASE: 25 U/L (ref 13–60)
LYMPHOCYTES ABSOLUTE: 2 K/UL (ref 1–5.1)
LYMPHOCYTES RELATIVE PERCENT: 40.1 %
MCH RBC QN AUTO: 29.5 PG (ref 26–34)
MCHC RBC AUTO-ENTMCNC: 34.4 G/DL (ref 31–36)
MCV RBC AUTO: 85.9 FL (ref 80–100)
MICROSCOPIC EXAMINATION: YES
MONOCYTES ABSOLUTE: 0.4 K/UL (ref 0–1.3)
MONOCYTES RELATIVE PERCENT: 7.4 %
MUCUS: ABNORMAL /LPF
NEUTROPHILS ABSOLUTE: 2.5 K/UL (ref 1.7–7.7)
NEUTROPHILS RELATIVE PERCENT: 50.2 %
NITRITE, URINE: NEGATIVE
PDW BLD-RTO: 13.5 % (ref 12.4–15.4)
PH UA: 6
PLATELET # BLD: 196 K/UL (ref 135–450)
PMV BLD AUTO: 8.3 FL (ref 5–10.5)
POTASSIUM REFLEX MAGNESIUM: 4.2 MMOL/L (ref 3.5–5.1)
PRO-BNP: 24 PG/ML (ref 0–124)
PROTEIN UA: NEGATIVE MG/DL
RBC # BLD: 4.82 M/UL (ref 4–5.2)
RBC UA: ABNORMAL /HPF (ref 0–2)
SODIUM BLD-SCNC: 139 MMOL/L (ref 136–145)
SPECIFIC GRAVITY UA: >=1.03
TOTAL PROTEIN: 7.4 G/DL (ref 6.4–8.2)
TROPONIN: <0.01 NG/ML
URINE TYPE: ABNORMAL
UROBILINOGEN, URINE: 0.2 E.U./DL
WBC # BLD: 5 K/UL (ref 4–11)
WBC UA: ABNORMAL /HPF (ref 0–5)

## 2018-10-09 PROCEDURE — 6360000002 HC RX W HCPCS: Performed by: STUDENT IN AN ORGANIZED HEALTH CARE EDUCATION/TRAINING PROGRAM

## 2018-10-09 PROCEDURE — 6370000000 HC RX 637 (ALT 250 FOR IP): Performed by: STUDENT IN AN ORGANIZED HEALTH CARE EDUCATION/TRAINING PROGRAM

## 2018-10-09 PROCEDURE — 96374 THER/PROPH/DIAG INJ IV PUSH: CPT

## 2018-10-09 PROCEDURE — 80076 HEPATIC FUNCTION PANEL: CPT

## 2018-10-09 PROCEDURE — 84703 CHORIONIC GONADOTROPIN ASSAY: CPT

## 2018-10-09 PROCEDURE — 84484 ASSAY OF TROPONIN QUANT: CPT

## 2018-10-09 PROCEDURE — 99285 EMERGENCY DEPT VISIT HI MDM: CPT

## 2018-10-09 PROCEDURE — 76705 ECHO EXAM OF ABDOMEN: CPT

## 2018-10-09 PROCEDURE — 80048 BASIC METABOLIC PNL TOTAL CA: CPT

## 2018-10-09 PROCEDURE — 83880 ASSAY OF NATRIURETIC PEPTIDE: CPT

## 2018-10-09 PROCEDURE — 83690 ASSAY OF LIPASE: CPT

## 2018-10-09 PROCEDURE — 85025 COMPLETE CBC W/AUTO DIFF WBC: CPT

## 2018-10-09 PROCEDURE — 81001 URINALYSIS AUTO W/SCOPE: CPT

## 2018-10-09 PROCEDURE — 71046 X-RAY EXAM CHEST 2 VIEWS: CPT

## 2018-10-09 PROCEDURE — 93005 ELECTROCARDIOGRAM TRACING: CPT | Performed by: EMERGENCY MEDICINE

## 2018-10-09 RX ORDER — POLYETHYLENE GLYCOL 3350 17 G/17G
17 POWDER, FOR SOLUTION ORAL DAILY
Qty: 1 BOTTLE | Refills: 0 | Status: SHIPPED | OUTPATIENT
Start: 2018-10-09 | End: 2018-10-16

## 2018-10-09 RX ORDER — ONDANSETRON 2 MG/ML
4 INJECTION INTRAMUSCULAR; INTRAVENOUS EVERY 6 HOURS PRN
Status: DISCONTINUED | OUTPATIENT
Start: 2018-10-09 | End: 2018-10-09 | Stop reason: HOSPADM

## 2018-10-09 RX ORDER — SENNOSIDES 8.6 MG
1 TABLET ORAL DAILY
Qty: 120 TABLET | Refills: 0 | Status: SHIPPED | OUTPATIENT
Start: 2018-10-09 | End: 2020-05-05

## 2018-10-09 RX ORDER — MECLIZINE HYDROCHLORIDE 25 MG/1
25 TABLET ORAL 3 TIMES DAILY PRN
Qty: 20 TABLET | Refills: 0 | Status: SHIPPED | OUTPATIENT
Start: 2018-10-09 | End: 2021-02-19 | Stop reason: ALTCHOICE

## 2018-10-09 RX ADMIN — LIDOCAINE HYDROCHLORIDE: 20 SOLUTION ORAL; TOPICAL at 14:20

## 2018-10-09 RX ADMIN — ONDANSETRON 4 MG: 2 INJECTION INTRAMUSCULAR; INTRAVENOUS at 14:21

## 2018-10-09 ASSESSMENT — ENCOUNTER SYMPTOMS
DIARRHEA: 0
TROUBLE SWALLOWING: 0
COUGH: 0
BLOOD IN STOOL: 0
ABDOMINAL PAIN: 1
SHORTNESS OF BREATH: 0
VOMITING: 0
SORE THROAT: 0
NAUSEA: 1

## 2018-10-09 ASSESSMENT — PAIN DESCRIPTION - LOCATION: LOCATION: ABDOMEN

## 2018-10-09 ASSESSMENT — PAIN SCALES - GENERAL: PAINLEVEL_OUTOF10: 5

## 2018-10-09 ASSESSMENT — PAIN DESCRIPTION - PAIN TYPE: TYPE: ACUTE PAIN

## 2018-10-09 NOTE — ED PROVIDER NOTES
Neurological: Positive for light-headedness. Negative for speech difficulty, weakness, numbness and headaches. Psychiatric/Behavioral: Negative for confusion and sleep disturbance. Past Medical, Surgical, Family, and Social History     She has a past medical history of Depression; Dysmenorrhea; Herniated lumbar intervertebral disc; Hormone disorder; Psoriatic arthritis ([address] Seasonal allergic rhinitis; and Thyroid disease. She has a past surgical history that includes [place] tooth extraction and Foot neuroma surgery (2007). Her family history includes Coronary Art Dis in her maternal grandfather; Diabetes in her sister; No Known Problems in her brother, father, maternal aunt, maternal grandmother, maternal uncle, mother, paternal aunt, paternal grandfather, paternal grandmother, and another family member; Other in her paternal uncle. She reports that she has never smoked. She has never used smokeless tobacco. She reports that she drinks about 2.4 oz of alcohol per week . She reports that she does not use drugs.     Medications     Previous Medications    ALBUTEROL SULFATE HFA (PROAIR HFA) 108 (90 BASE) MCG/ACT INHALER    Inhale 2 puffs into the lungs every 4 hours as needed for Wheezing    CALCIUM CARB-CHOLECALCIFEROL (CALTRATE 600+D) 600-800 MG-UNIT TABS    Take by mouth    CLINDAMYCIN (CLEOCIN T) 1 % EXTERNAL SOLUTION    Apply to affected area BID    CLOBETASOL (OLUX) 0.05 % FOAM    APPLY TO AFFECTED AREA TWO TIMES A DAY AS NEEDED FOR FLARES    CLOBETASOL (TEMOVATE) 0.05 % CREAM    Apply to affected area BID PRN flares    CLOBETASOL (TEMOVATE) 0.05 % EXTERNAL SOLUTION    APPLY TO THE AFFECTED AREA TWO TIMES A DAY AS NEEDED FOR FLARES    ETANERCEPT (ENBREL) 50 MG/ML INJECTION    Inject 25 mg into the skin once    FEXOFENADINE (ALLEGRA ALLERGY) 180 MG TABLET    Take 1 tablet by mouth daily    FLUTICASONE (FLONASE SENSIMIST) 27.5 MCG/SPRAY NASAL SPRAY    2 sprays by Nasal route daily    MELOXICAM (MOBIC) 15 MG TABLET    Take 15 mg by mouth daily. MULTIPLE VITAMIN (MULTIVITAMINS PO)    Take by mouth    PIMECROLIMUS (ELIDEL) 1 % CREAM    Apply topically 2 times daily. PSEUDOEPHEDRINE (SUDAFED) 30 MG TABLET    Take 2 tablets by mouth every 6 hours as needed for Congestion. Allergies     She is allergic to erythromycin; amoxicillin; and sulfa antibiotics. Physical Exam     INITIAL VITALS: BP: 126/65, Temp: 98.3 °F (36.8 °C), Pulse: 65, Resp: 15, SpO2: 100 %   Physical Exam   Constitutional: She is oriented to person, place, and time. She appears well-developed and well-nourished. No distress. HENT:   Head: Normocephalic and atraumatic. Eyes: Pupils are equal, round, and reactive to light. Conjunctivae and EOM are normal.   Neck: Normal range of motion. Cardiovascular: Normal rate, regular rhythm and normal heart sounds. Pulmonary/Chest: Effort normal and breath sounds normal.   Abdominal: Soft. She exhibits no distension and no mass. There is tenderness. There is no guarding. Obese. Mild tenderness to palpation in the left lower quadrant. Musculoskeletal: Normal range of motion. Lymphadenopathy:     She has no cervical adenopathy. Neurological: She is alert and oriented to person, place, and time. No cranial nerve deficit. Coordination normal.   EOM intact. Pupils are equal and reactive bilaterally. No cranial nerve deficit. 5/5 strength in the upper and lower extremities. Sensation is intact. Normal gait    Skin: No rash noted. Diagnostic Results     EKG   Interpreted in conjunction with emergency department physician Frank Damon MD  Rhythm: normal sinus   Rate: normal  Axis: normal  Ectopy: none  Conduction: normal  ST Segments: nonspecific changes  T Waves: non specific changes  Q Waves: none  Clinical Impression: NSR  Comparison:  No prior    RADIOLOGY:  XR CHEST STANDARD (2 VW)   Final Result      No focal consolidation.                     LABS:   Results for orders placed or performed during the hospital encounter of 10/09/18   CBC auto differential   Result Value Ref Range    WBC 5.0 4.0 - 11.0 K/uL    RBC 4.82 4.00 - 5.20 M/uL    Hemoglobin 14.2 12.0 - 16.0 g/dL    Hematocrit 41.4 36.0 - 48.0 %    MCV 85.9 80.0 - 100.0 fL    MCH 29.5 26.0 - 34.0 pg    MCHC 34.4 31.0 - 36.0 g/dL    RDW 13.5 12.4 - 15.4 %    Platelets 825 553 - 781 K/uL    MPV 8.3 5.0 - 10.5 fL    Neutrophils % 50.2 %    Lymphocytes % 40.1 %    Monocytes % 7.4 %    Eosinophils % 1.9 %    Basophils % 0.4 %    Neutrophils # 2.5 1.7 - 7.7 K/uL    Lymphocytes # 2.0 1.0 - 5.1 K/uL    Monocytes # 0.4 0.0 - 1.3 K/uL    Eosinophils # 0.1 0.0 - 0.6 K/uL    Basophils # 0.0 0.0 - 0.2 K/uL   Basic Metabolic Panel w/ Reflex to MG   Result Value Ref Range    Sodium 139 136 - 145 mmol/L    Potassium reflex Magnesium 4.2 3.5 - 5.1 mmol/L    Chloride 101 99 - 110 mmol/L    CO2 25 21 - 32 mmol/L    Anion Gap 13 3 - 16    Glucose 98 70 - 99 mg/dL    BUN 10 7 - 20 mg/dL    CREATININE 0.8 0.6 - 1.1 mg/dL    GFR Non-African American >60 >60    GFR African American >60 >60    Calcium 9.5 8.3 - 10.6 mg/dL   Hepatic function panel   Result Value Ref Range    Total Protein 7.4 6.4 - 8.2 g/dL    Alb 4.6 3.4 - 5.0 g/dL    Alkaline Phosphatase 59 40 - 129 U/L    ALT 73 (H) 10 - 40 U/L    AST 51 (H) 15 - 37 U/L    Total Bilirubin 0.5 0.0 - 1.0 mg/dL    Bilirubin, Direct <0.2 0.0 - 0.3 mg/dL    Bilirubin, Indirect see below 0.0 - 1.0 mg/dL   Lipase   Result Value Ref Range    Lipase 25.0 13.0 - 60.0 U/L   Urinalysis,reflex to microscopic (Lab)(UA)   Result Value Ref Range    Color, UA Yellow Straw/Yellow    Clarity, UA Clear Clear    Glucose, Ur Negative Negative mg/dL    Bilirubin Urine Negative Negative    Ketones, Urine Negative Negative mg/dL    Specific Gravity, UA >=1.030 1.005 - 1.030    Blood, Urine TRACE-LYSED (A) Negative    pH, UA 6.0 5.0 - 8.0    Protein, UA Negative Negative mg/dL    Urobilinogen, Urine 0.2 <2.0 E.U./dL    Nitrite, DEE DEE Henson is a 26 y.o. female with history of hepatic steatosis and obesity who presents with abdominal pain and dizziness. On presentation she was in no acute distress and hematocrit stable. She overall appeared well. Her abdomen was soft and nontender and overall benign. She had a normal neurologic exam and normal gait. Symptoms have been present for about a month with reassuring work up so far in the ED and by her PCP. Lab work was again reassuring with unremarkable labs- CBC, BMP, hepatic panel with slight elevation of AST and ALT consistent with previous hepatic steatosis, troponin not elevated, BNP not elevated, EKG normal sinus and chest x-ray unremarkable. UA also unremarkable pregnancy test negative. Patient had a right upper quadrant ultrasound performed today that was unremarkable. CT scan of previous emergency visit was unremarkable. Patient overall has a soft nontender abdomen with reassuring blood work today but feel repeat imaging not warranted. I recommended treating for constipation and prescribed MiraLAX and senna. Patient also reports history of dizziness over the same period of time. It does not appear to be vertigo and there are no deficits on neurologic exam.  Therefore low suspicion for intracranial process. She was given meclizine person to medical improvement and discharged to follow-up with her primary care doctor. Medications   ondansetron (ZOFRAN) injection 4 mg (4 mg Intravenous Given 10/9/18 1421)   aluminum & magnesium hydroxide-simethicone (MAALOX) 30 mL, lidocaine viscous (XYLOCAINE) 5 mL (GI COCKTAIL) ( Oral Given 10/9/18 1420)       This patient was also evaluated by the attending physician. All care plans were discussed and agreed upon. Clinical Impression     1. Generalized abdominal pain    2.  Dizziness        Disposition     PATIENT REFERRED TO:  Brady Dave MD  [address]  [address] [facility] [id number] [phone]    Schedule an

## 2018-10-09 NOTE — ED NOTES
Pt discharged from ED in stable, ambulatory condition. Discharge instructions explained, all questions answered. Prescriptions given. Pt walked to [facility] independently.        Mary Shafer RN  10/09/18 [id number]

## 2018-10-11 ENCOUNTER — OFFICE VISIT (OUTPATIENT)
Dept: ENT CLINIC | Age: 33
End: 2018-10-11
Payer: COMMERCIAL

## 2018-10-11 VITALS — HEART RATE: 68 BPM | HEIGHT: 71 IN | WEIGHT: 290 LBS | BODY MASS INDEX: 40.6 KG/M2

## 2018-10-11 DIAGNOSIS — R42 DIZZINESS: Primary | ICD-10-CM

## 2018-10-11 DIAGNOSIS — R26.89 IMBALANCE: ICD-10-CM

## 2018-10-11 DIAGNOSIS — H53.19 OSCILLOPSIA: ICD-10-CM

## 2018-10-11 PROCEDURE — 99244 OFF/OP CNSLTJ NEW/EST MOD 40: CPT | Performed by: OTOLARYNGOLOGY

## 2018-10-11 ASSESSMENT — ENCOUNTER SYMPTOMS
FACIAL SWELLING: 0
SORE THROAT: 0
DIARRHEA: 0
STRIDOR: 0
VOMITING: 0
TROUBLE SWALLOWING: 0
CHOKING: 0
SINUS PRESSURE: 0
BACK PAIN: 0
APNEA: 0
VOICE CHANGE: 0
CONSTIPATION: 0
SINUS PAIN: 0
COUGH: 0
COLOR CHANGE: 0
NAUSEA: 0
BLOOD IN STOOL: 0
CHEST TIGHTNESS: 0
EYE DISCHARGE: 0
WHEEZING: 0
RHINORRHEA: 0
EYE PAIN: 0
SHORTNESS OF BREATH: 0

## 2018-10-11 NOTE — PROGRESS NOTES
posterior oropharyngeal edema, posterior oropharyngeal erythema or tonsillar abscesses. Eyes: Lids are normal. Lids are everted and swept, no foreign bodies found. Right eye exhibits no chemosis, no discharge and no exudate. Left eye exhibits no chemosis, no discharge and no exudate. Right eye exhibits normal extraocular motion and no nystagmus. Left eye exhibits normal extraocular motion and no nystagmus. Neck: Trachea normal. Neck supple. Normal carotid pulses present. No tracheal deviation present. No thyroid mass and no thyromegaly present. Cardiovascular: Normal rate and regular rhythm. Pulmonary/Chest: No stridor. No apnea, no tachypnea and no bradypnea. No respiratory distress. Musculoskeletal:        Right shoulder: She exhibits normal range of motion. Left shoulder: She exhibits normal range of motion. Lymphadenopathy:        Head (right side): No submental, no submandibular, no tonsillar, no preauricular, no posterior auricular and no occipital adenopathy present. Head (left side): No submental, no submandibular, no tonsillar, no preauricular, no posterior auricular and no occipital adenopathy present. Right cervical: No superficial cervical, no deep cervical and no posterior cervical adenopathy present. Left cervical: No superficial cervical, no deep cervical and no posterior cervical adenopathy present. Neurological: She is alert and oriented to person, place, and time. Skin: No bruising, no laceration and no lesion noted. No erythema. Psychiatric: She has a normal mood and affect. Her speech is normal and behavior is normal.       Assessment:       Diagnosis Orders   1. Dizziness  Ambulatory referral to Audiology   2. Imbalance     3. Oscillopsia             Plan:      Presumed viral etiology  VNG. Call with results.    Likely will need balance rehabillatation        Josie Saldivar MD

## 2018-10-12 LAB
EKG ATRIAL RATE: 71 BPM
EKG DIAGNOSIS: NORMAL
EKG P AXIS: 50 DEGREES
EKG P-R INTERVAL: 152 MS
EKG Q-T INTERVAL: 402 MS
EKG QRS DURATION: 84 MS
EKG QTC CALCULATION (BAZETT): 436 MS
EKG R AXIS: 43 DEGREES
EKG T AXIS: 28 DEGREES
EKG VENTRICULAR RATE: 71 BPM

## 2018-10-23 ENCOUNTER — PROCEDURE VISIT (OUTPATIENT)
Dept: AUDIOLOGY | Age: 33
End: 2018-10-23
Payer: COMMERCIAL

## 2018-10-23 DIAGNOSIS — R42 DIZZINESS: Primary | ICD-10-CM

## 2018-10-23 PROCEDURE — 92540 BASIC VESTIBULAR EVALUATION: CPT | Performed by: AUDIOLOGIST

## 2018-10-23 PROCEDURE — 92537 CALORIC VSTBLR TEST W/REC: CPT | Performed by: AUDIOLOGIST

## 2018-11-02 ENCOUNTER — OFFICE VISIT (OUTPATIENT)
Dept: INTERNAL MEDICINE CLINIC | Age: 33
End: 2018-11-02
Payer: COMMERCIAL

## 2018-11-02 VITALS
SYSTOLIC BLOOD PRESSURE: 120 MMHG | WEIGHT: 293 LBS | DIASTOLIC BLOOD PRESSURE: 72 MMHG | HEART RATE: 88 BPM | OXYGEN SATURATION: 98 % | BODY MASS INDEX: 41.24 KG/M2

## 2018-11-02 DIAGNOSIS — E66.01 MORBID OBESITY WITH BMI OF 40.0-44.9, ADULT (HCC): ICD-10-CM

## 2018-11-02 DIAGNOSIS — R42 VERTIGO: Primary | ICD-10-CM

## 2018-11-02 DIAGNOSIS — K76.0 FATTY LIVER DISEASE, NONALCOHOLIC: ICD-10-CM

## 2018-11-02 DIAGNOSIS — E78.00 PURE HYPERCHOLESTEROLEMIA: ICD-10-CM

## 2018-11-02 DIAGNOSIS — E03.4 HYPOTHYROIDISM DUE TO ACQUIRED ATROPHY OF THYROID: ICD-10-CM

## 2018-11-02 PROCEDURE — 99214 OFFICE O/P EST MOD 30 MIN: CPT | Performed by: INTERNAL MEDICINE

## 2019-01-04 ENCOUNTER — TELEPHONE (OUTPATIENT)
Dept: INTERNAL MEDICINE CLINIC | Age: 34
End: 2019-01-04

## 2019-01-15 ENCOUNTER — OFFICE VISIT (OUTPATIENT)
Dept: INTERNAL MEDICINE CLINIC | Age: 34
End: 2019-01-15
Payer: COMMERCIAL

## 2019-01-15 VITALS
HEART RATE: 85 BPM | DIASTOLIC BLOOD PRESSURE: 80 MMHG | SYSTOLIC BLOOD PRESSURE: 128 MMHG | WEIGHT: 292 LBS | OXYGEN SATURATION: 98 % | BODY MASS INDEX: 40.96 KG/M2

## 2019-01-15 DIAGNOSIS — E34.8 CYST OF PINEAL GLAND: ICD-10-CM

## 2019-01-15 DIAGNOSIS — G43.809 VESTIBULAR MIGRAINE: Primary | ICD-10-CM

## 2019-01-15 PROCEDURE — 99214 OFFICE O/P EST MOD 30 MIN: CPT | Performed by: INTERNAL MEDICINE

## 2019-01-15 RX ORDER — MAGNESIUM 200 MG
400 TABLET ORAL DAILY
COMMUNITY
End: 2019-11-08 | Stop reason: ALTCHOICE

## 2019-01-18 ENCOUNTER — NURSE ONLY (OUTPATIENT)
Dept: INTERNAL MEDICINE CLINIC | Age: 34
End: 2019-01-18
Payer: COMMERCIAL

## 2019-01-18 DIAGNOSIS — Z23 NEEDS FLU SHOT: Primary | ICD-10-CM

## 2019-01-18 PROCEDURE — 90686 IIV4 VACC NO PRSV 0.5 ML IM: CPT | Performed by: INTERNAL MEDICINE

## 2019-01-18 PROCEDURE — 90471 IMMUNIZATION ADMIN: CPT | Performed by: INTERNAL MEDICINE

## 2019-05-03 ENCOUNTER — OFFICE VISIT (OUTPATIENT)
Dept: INTERNAL MEDICINE CLINIC | Age: 34
End: 2019-05-03
Payer: COMMERCIAL

## 2019-05-03 VITALS
WEIGHT: 291 LBS | DIASTOLIC BLOOD PRESSURE: 76 MMHG | HEART RATE: 79 BPM | SYSTOLIC BLOOD PRESSURE: 118 MMHG | OXYGEN SATURATION: 98 % | BODY MASS INDEX: 40.82 KG/M2

## 2019-05-03 DIAGNOSIS — K76.0 FATTY LIVER DISEASE, NONALCOHOLIC: ICD-10-CM

## 2019-05-03 DIAGNOSIS — E03.4 HYPOTHYROIDISM DUE TO ACQUIRED ATROPHY OF THYROID: ICD-10-CM

## 2019-05-03 DIAGNOSIS — L40.50 PSORIATIC ARTHRITIS (HCC): Chronic | ICD-10-CM

## 2019-05-03 DIAGNOSIS — E66.01 MORBID OBESITY WITH BMI OF 40.0-44.9, ADULT (HCC): ICD-10-CM

## 2019-05-03 DIAGNOSIS — E78.00 PURE HYPERCHOLESTEROLEMIA: Primary | ICD-10-CM

## 2019-05-03 PROBLEM — J45.20 MILD INTERMITTENT ASTHMA: Status: ACTIVE | Noted: 2019-05-03

## 2019-05-03 PROCEDURE — 99214 OFFICE O/P EST MOD 30 MIN: CPT | Performed by: INTERNAL MEDICINE

## 2019-05-03 PROCEDURE — 90471 IMMUNIZATION ADMIN: CPT | Performed by: INTERNAL MEDICINE

## 2019-05-03 PROCEDURE — 90715 TDAP VACCINE 7 YRS/> IM: CPT | Performed by: INTERNAL MEDICINE

## 2019-05-03 NOTE — PATIENT INSTRUCTIONS
Physical Therapy Facilities:    [facility]  [address]  [phone] ([id number]) [phone] [facility])    [phone]  [phone]    [facility] ([address]    [address]  [phone] ([facility]) [phone] Jessica Cunningham)

## 2019-05-03 NOTE — PROGRESS NOTES
Assessment/Plan     1. Pure hypercholesterolemia  Importance of lifestyle changes stressed to help prevent need for cholesterol medication in the future. - Lipid, Fasting; Future  - Comprehensive Metabolic Panel, Fasting; Future    2. Fatty liver disease, nonalcoholic  Asymptomatic. Importance of low fat, low carbohydrate diet and weight loss stressed to help prevent progression. She will continue to avoid Tylenol, NSAIDs, alcohol and other hepatoxins. 3. Morbid obesity with BMI of 40.0-44.9, adult [facility])  Patient counseled on diet and exercise. She was referred to our dietitian,  She will be pursuing PT for her back per rheumatology, then will be transitioning to personal training.  - Hemoglobin A1C; Future    4. Hypothyroidism due to acquired atrophy of thyroid  Clinically euthyroid, but will start levothyroxine if indicated by lab results.   - TSH with Reflex; Future    5. Psoriatic arthritis ([address]  Stable. Will check monitoring labs for rheumatologist.  - CBC Auto Differential; Future  - Sedimentation Rate; Future  - C-Reactive Protein; Future          Return in about 6 months (around 11/3/2019). Nancy Yee   YOB: 1985    Date of Visit:  5/3/2019    Prior to Visit Medications    Medication Sig Taking?  Authorizing Provider   ondansetron (ZOFRAN ODT) 4 MG disintegrating tablet Take 1 tablet by mouth every 8 hours as needed for Nausea Yes James Billy MD   clindamycin (CLEOCIN T) 1 % external solution Apply to affected area BID Yes Jennifer Leary MD   clobetasol (OLUX) 0.05 % foam APPLY TO AFFECTED AREA TWO TIMES A DAY AS NEEDED FOR FLARES Yes Jennifer Leary MD   clobetasol (TEMOVATE) 0.05 % external solution APPLY TO THE AFFECTED AREA TWO TIMES A DAY AS NEEDED FOR FLARES Yes Jennifer Leary MD   albuterol sulfate HFA (PROAIR HFA) 108 (90 Base) MCG/ACT inhaler Inhale 2 puffs into the lungs every 4 hours as needed for Wheezing Yes James Billy MD   fluticasone (FLONASE SENSIMIST) 27.5 MCG/SPRAY nasal spray 2 sprays by Nasal route daily Yes Historical Provider, MD   fexofenadine (ALLEGRA ALLERGY) 180 MG tablet Take 1 tablet by mouth daily Yes Sanjuana Palacios MD   clobetasol (TEMOVATE) 0.05 % cream Apply to affected area BID PRN flares Yes Jack Tsang MD   pimecrolimus (ELIDEL) 1 % cream Apply topically 2 times daily. Yes Jack Tsang MD   Multiple Vitamin (MULTIVITAMINS PO) Take by mouth Yes Historical Provider, MD   etanercept (ENBREL) 50 MG/ML injection Inject 25 mg into the skin once Yes Historical Provider, MD   meloxicam (MOBIC) 15 MG tablet Take 15 mg by mouth daily. Yes Historical Provider, MD   magnesium 200 MG TABS tablet Take 400 mg by mouth daily  Historical Provider, MD   Coenzyme Q10 (CO Q-10 PO) Take by mouth  Historical Provider, MD   RIBOFLAVIN PO Take by mouth  Historical Provider, MD   senna (SENOKOT) 8.6 MG TABS tablet Take 1 tablet by mouth daily  Joan Paez MD   meclizine (ANTIVERT) 25 MG tablet Take 1 tablet by mouth 3 times daily as needed for Dizziness  Joan Paez MD   Calcium Carb-Cholecalciferol (CALTRATE 600+D) 600-800 MG-UNIT TABS Take by mouth  Historical Provider, MD   pseudoephedrine (SUDAFED) 30 MG tablet Take 2 tablets by mouth every 6 hours as needed for Congestion. Sanjuana Palacios MD        Vitals:    05/03/19 5538   BP: 118/76   Pulse: 79   SpO2: 98%   Weight: 291 lb (132 kg)     Estimated body mass index is 40.82 kg/m² as calculated from the following:    Height as of 10/11/18: 5' 10.8\" (1.798 m). Weight as of this encounter: 291 lb (132 kg).     Wt Readings from Last 3 Encounters:   05/03/19 291 lb (132 kg)   01/15/19 292 lb (132.5 kg)   11/02/18 294 lb (133.4 kg)     BP Readings from Last 3 Encounters:   05/03/19 118/76   01/15/19 128/80   11/02/18 120/72       CC:  Patient presents for re-evaluation of the following medical concerns     HPI  Hyperlipidemia/fatty liver/obesity:  Patient is not following any particular diet plan currently. No exercise currently. OTC Supplements: none. No significant change in weight. No abdominal pain, N/V, dark urine. Lab Results   Component Value Date    CHOL 205 (H) 04/13/2017    TRIG 230 (H) 04/13/2017    HDL 34 (L) 11/02/2018    LDLCALC 107 (H) 11/02/2018     Lab Results   Component Value Date    ALT 65 (H) 11/02/2018    AST 35 11/02/2018        Hypothyroidism: Recent symptoms:  None. She denies fatigue, hair loss, weight changes bowel changes. No Synthroid for the past 2 years. Lab Results   Component Value Date    TSHREFLEX 2.21 11/02/2018    TSHREFLEX 2.53 04/24/2018    TSHREFLEX 2.13 10/27/2017     Lab Results   Component Value Date    TSH 4.55 (H) 04/13/2017    TSH 4.02 11/15/2016    TSH 2.47 04/12/2016     Psoriatic arthritis:  Psoriasis unchanged- using Elidel, topical steroids and Enbrel. Joint pain stable, only uses Meloxicam intermittently for breakthrough pain. .    Review of Systems  As documented in HPI    Physical Exam   Constitutional: She is oriented to person, place, and time. She appears well-developed and well-nourished. No distress. HENT:   Mouth/Throat: Oropharynx is clear and moist and mucous membranes are normal.   Eyes: Conjunctivae are normal.   Neck: No thyroid mass and no thyromegaly present. Cardiovascular: Normal rate, regular rhythm and normal heart sounds. Exam reveals no gallop and no friction rub. No murmur heard. Pulmonary/Chest: Effort normal and breath sounds normal. No respiratory distress. She has no wheezes. She has no rhonchi. She has no rales. Musculoskeletal: She exhibits no edema. Lymphadenopathy:     She has no cervical adenopathy. Neurological: She is alert and oriented to person, place, and time. Skin: Skin is warm, dry and intact. Erythematous plaques on upper arms, posterior scalp without silvery scale. No surrounding erythema, satellite lesions, burrows, vesicles, or pustules.     Psychiatric: She has a normal mood and affect.  Her speech is normal and behavior is normal. Judgment and thought content normal. Cognition and memory are normal.

## 2019-11-05 LAB
ALBUMIN SERPL-MCNC: 4.3 G/DL
ALP BLD-CCNC: 61 U/L
ALT SERPL-CCNC: 49 U/L
ANION GAP SERPL CALCULATED.3IONS-SCNC: NORMAL MMOL/L
AST SERPL-CCNC: 31 U/L
BASOPHILS ABSOLUTE: NORMAL /ΜL
BASOPHILS RELATIVE PERCENT: NORMAL %
BILIRUB SERPL-MCNC: 0.6 MG/DL (ref 0.1–1.4)
BUN BLDV-MCNC: 15 MG/DL
CALCIUM SERPL-MCNC: 8.8 MG/DL
CHLORIDE BLD-SCNC: 103 MMOL/L
CHOLESTEROL, TOTAL: 182 MG/DL
CHOLESTEROL/HDL RATIO: ABNORMAL
CO2: 24 MMOL/L
CREAT SERPL-MCNC: 0.77 MG/DL
EOSINOPHILS ABSOLUTE: NORMAL /ΜL
EOSINOPHILS RELATIVE PERCENT: NORMAL %
GFR CALCULATED: NORMAL
GLUCOSE BLD-MCNC: 107 MG/DL
HCT VFR BLD CALC: 40.7 % (ref 36–46)
HDLC SERPL-MCNC: 40 MG/DL (ref 35–70)
HEMOGLOBIN: 13.6 G/DL (ref 12–16)
LDL CHOLESTEROL CALCULATED: 108 MG/DL (ref 0–160)
LYMPHOCYTES ABSOLUTE: NORMAL /ΜL
LYMPHOCYTES RELATIVE PERCENT: NORMAL %
MCH RBC QN AUTO: 29.2 PG
MCHC RBC AUTO-ENTMCNC: 33.5 G/DL
MCV RBC AUTO: 87.2 FL
MONOCYTES ABSOLUTE: NORMAL /ΜL
MONOCYTES RELATIVE PERCENT: NORMAL %
NEUTROPHILS ABSOLUTE: NORMAL /ΜL
NEUTROPHILS RELATIVE PERCENT: NORMAL %
PLATELET # BLD: 188 K/ΜL
PMV BLD AUTO: NORMAL FL
POTASSIUM SERPL-SCNC: 3.8 MMOL/L
RBC # BLD: 4.66 10^6/ΜL
SODIUM BLD-SCNC: 137 MMOL/L
TOTAL PROTEIN: 7.2
TRIGL SERPL-MCNC: 170 MG/DL
VLDLC SERPL CALC-MCNC: ABNORMAL MG/DL
WBC # BLD: 5.3 10^3/ML

## 2019-11-08 ENCOUNTER — OFFICE VISIT (OUTPATIENT)
Dept: INTERNAL MEDICINE CLINIC | Age: 34
End: 2019-11-08
Payer: COMMERCIAL

## 2019-11-08 VITALS
HEIGHT: 70 IN | SYSTOLIC BLOOD PRESSURE: 110 MMHG | WEIGHT: 293 LBS | OXYGEN SATURATION: 98 % | HEART RATE: 62 BPM | DIASTOLIC BLOOD PRESSURE: 64 MMHG | BODY MASS INDEX: 41.95 KG/M2

## 2019-11-08 DIAGNOSIS — E78.00 PURE HYPERCHOLESTEROLEMIA: Primary | ICD-10-CM

## 2019-11-08 DIAGNOSIS — K76.0 FATTY LIVER DISEASE, NONALCOHOLIC: ICD-10-CM

## 2019-11-08 DIAGNOSIS — E66.01 MORBID OBESITY WITH BMI OF 40.0-44.9, ADULT (HCC): ICD-10-CM

## 2019-11-08 DIAGNOSIS — E03.4 HYPOTHYROIDISM DUE TO ACQUIRED ATROPHY OF THYROID: ICD-10-CM

## 2019-11-08 DIAGNOSIS — L40.50 PSORIATIC ARTHRITIS (HCC): Chronic | ICD-10-CM

## 2019-11-08 PROCEDURE — 90471 IMMUNIZATION ADMIN: CPT | Performed by: INTERNAL MEDICINE

## 2019-11-08 PROCEDURE — 99214 OFFICE O/P EST MOD 30 MIN: CPT | Performed by: INTERNAL MEDICINE

## 2019-11-08 PROCEDURE — G8417 CALC BMI ABV UP PARAM F/U: HCPCS | Performed by: INTERNAL MEDICINE

## 2019-11-08 PROCEDURE — 1036F TOBACCO NON-USER: CPT | Performed by: INTERNAL MEDICINE

## 2019-11-08 PROCEDURE — G8482 FLU IMMUNIZE ORDER/ADMIN: HCPCS | Performed by: INTERNAL MEDICINE

## 2019-11-08 PROCEDURE — 90686 IIV4 VACC NO PRSV 0.5 ML IM: CPT | Performed by: INTERNAL MEDICINE

## 2019-11-08 PROCEDURE — G8427 DOCREV CUR MEDS BY ELIG CLIN: HCPCS | Performed by: INTERNAL MEDICINE

## 2019-11-08 RX ORDER — MAGNESIUM GLYCINATE 100 MG
200 TABLET ORAL DAILY
COMMUNITY

## 2019-11-08 RX ORDER — PIMECROLIMUS 10 MG/G
CREAM TOPICAL
Qty: 1 BOTTLE | Refills: 6 | Status: SHIPPED | OUTPATIENT
Start: 2019-11-08

## 2019-11-11 ENCOUNTER — TELEPHONE (OUTPATIENT)
Dept: FAMILY MEDICINE CLINIC | Age: 34
End: 2019-11-11

## 2020-01-21 ENCOUNTER — OFFICE VISIT (OUTPATIENT)
Dept: GYNECOLOGY | Age: 35
End: 2020-01-21
Payer: COMMERCIAL

## 2020-01-21 VITALS
SYSTOLIC BLOOD PRESSURE: 144 MMHG | HEIGHT: 70 IN | HEART RATE: 63 BPM | WEIGHT: 293 LBS | RESPIRATION RATE: 17 BRPM | BODY MASS INDEX: 41.95 KG/M2 | DIASTOLIC BLOOD PRESSURE: 88 MMHG

## 2020-01-21 PROCEDURE — G8482 FLU IMMUNIZE ORDER/ADMIN: HCPCS | Performed by: OBSTETRICS & GYNECOLOGY

## 2020-01-21 PROCEDURE — 99385 PREV VISIT NEW AGE 18-39: CPT | Performed by: OBSTETRICS & GYNECOLOGY

## 2020-01-21 RX ORDER — ACETAMINOPHEN AND CODEINE PHOSPHATE 120; 12 MG/5ML; MG/5ML
1 SOLUTION ORAL DAILY
Qty: 90 TABLET | Refills: 3 | Status: SHIPPED
Start: 2020-01-21 | End: 2020-05-12 | Stop reason: SINTOL

## 2020-01-21 ASSESSMENT — ENCOUNTER SYMPTOMS
GASTROINTESTINAL NEGATIVE: 1
EYES NEGATIVE: 1
RESPIRATORY NEGATIVE: 1

## 2020-01-22 NOTE — PROGRESS NOTES
Subjective:      Patient ID: Zohra Sarkar is a 37 y.o. female. Patient is here for annual. Patient with some vulvar irritation after cycle. Patient with menstrual migraines. Review of Systems   Constitutional: Negative. HENT: Negative. Eyes: Negative. Respiratory: Negative. Cardiovascular: Negative. Gastrointestinal: Negative. Genitourinary: Negative. Musculoskeletal: Negative. Skin: Negative. Neurological: Positive for headaches. Psychiatric/Behavioral: Negative. Date of Birth 1985  Past Medical History:   Diagnosis Date    Depression     Dysmenorrhea     Herniated lumbar intervertebral disc     L3, L5-S1 with impingement    Hormone disorder     Mild intermittent asthma 5/3/2019    Polycystic ovary syndrome     [address] [address]     Seasonal allergic rhinitis 10/13/2016    Thyroid disease      Past Surgical History:   Procedure Laterality Date    FOOT NEUROMA SURGERY      Right  x2    WISDOM TOOTH EXTRACTION       OB History    Para Term  AB Living   0 0 0 0 0 0   SAB TAB Ectopic Molar Multiple Live Births   0 0 0   0       Social History     Socioeconomic History    Marital status: Single     Spouse name: Not on file    Number of children: 0    Years of education: Not on file    Highest education level: Not on file   Occupational History    Occupation: Student   Social Needs    Financial resource strain: Not on file    Food insecurity:     Worry: Not on file     Inability: Not on file    Transportation needs:     Medical: Not on file     Non-medical: Not on file   Tobacco Use    Smoking status: Never Smoker    Smokeless tobacco: Never Used   Substance and Sexual Activity    Alcohol use:  Yes     Alcohol/week: 4.0 standard drinks     Types: 2 Glasses of wine, 2 Shots of liquor per week     Comment: social    Drug use: No    Sexual activity: Not Currently   Lifestyle    Physical activity:     Days per week: Not on 1 tablet by mouth daily      clobetasol (TEMOVATE) 0.05 % cream Apply to affected area BID PRN flares 60 g 6    Multiple Vitamin (MULTIVITAMINS PO) Take by mouth      etanercept (ENBREL) 50 MG/ML injection Inject 25 mg into the skin once      meloxicam (MOBIC) 15 MG tablet Take 15 mg by mouth daily.  pseudoephedrine (SUDAFED) 30 MG tablet Take 2 tablets by mouth every 6 hours as needed for Congestion. Family History   Problem Relation Age of Onset    Diabetes Sister         Type I    Coronary Art Dis Maternal Grandfather         MI- 66's    Other Paternal Uncle         Aortic dissection, possible Marfan's    No Known Problems Mother     No Known Problems Father     No Known Problems Brother     No Known Problems Maternal Aunt     No Known Problems Maternal Uncle     Breast Cancer Paternal Aunt     No Known Problems Maternal Grandmother     No Known Problems Paternal Grandmother     No Known Problems Paternal Grandfather     No Known Problems Other     Rheum Arthritis Neg Hx     Osteoarthritis Neg Hx     Asthma Neg Hx     Cancer Neg Hx     Heart Failure Neg Hx     High Cholesterol Neg Hx     Hypertension Neg Hx     Migraines Neg Hx     Ovarian Cancer Neg Hx     Rashes/Skin Problems Neg Hx     Seizures Neg Hx     Stroke Neg Hx     Thyroid Disease Neg Hx      BP (!) 144/88 (Site: Right Lower Arm, Position: Sitting, Cuff Size: Large Adult)   Pulse 63   Resp 17   Ht 5' 10\" (1.778 m)   Wt (!) 308 lb (139.7 kg)   LMP 01/05/2020   BMI 44.19 kg/m²       Objective:   Physical Exam  Constitutional:       Appearance: Normal appearance. She is well-developed and normal weight. HENT:      Head: Normocephalic. Nose: Nose normal.      Mouth/Throat:      Mouth: Mucous membranes are moist.      Pharynx: Oropharynx is clear. Eyes:      Pupils: Pupils are equal, round, and reactive to light. Neck:      Musculoskeletal: Normal range of motion and neck supple. No neck rigidity. Thyroid: No thyromegaly. Cardiovascular:      Rate and Rhythm: Normal rate and regular rhythm. Pulses: Normal pulses. Heart sounds: Normal heart sounds. No murmur. No friction rub. No gallop. Pulmonary:      Effort: Pulmonary effort is normal. No respiratory distress. Breath sounds: Normal breath sounds. No stridor. No wheezing, rhonchi or rales. Chest:      Chest wall: No tenderness. Breasts:         Right: Normal. No swelling, bleeding, inverted nipple, mass, nipple discharge, skin change or tenderness. Left: Normal. No swelling, bleeding, inverted nipple, mass, nipple discharge, skin change or tenderness. Abdominal:      General: Bowel sounds are normal. There is no distension. Palpations: Abdomen is soft. There is no mass. Tenderness: There is no tenderness. There is no guarding or rebound. Hernia: No hernia is present. There is no hernia in the right inguinal area or left inguinal area. Genitourinary:     General: Normal vulva. Exam position: Lithotomy position. Pubic Area: No rash. Labia:         Right: No rash, tenderness, lesion or injury. Left: No rash, tenderness, lesion or injury. Uretha: No prolapse, urethral pain, urethral swelling or urethral lesion. Vagina: No signs of injury and foreign body. No vaginal discharge, erythema, tenderness, bleeding, lesions or prolapsed vaginal walls. Cervix: No cervical motion tenderness, discharge, friability, lesion, erythema, cervical bleeding or eversion. Uterus: Not deviated, not enlarged, not fixed and not tender. Adnexa:         Right: No mass, tenderness or fullness. Left: No mass, tenderness or fullness. Rectum: No anal fissure or external hemorrhoid. Comments: Normal urethral meatus, normal urethra, nl bladder  Musculoskeletal: Normal range of motion. General: No tenderness.    Lymphadenopathy:      Cervical: No cervical

## 2020-01-23 LAB
HPV COMMENT: NORMAL
HPV TYPE 16: NOT DETECTED
HPV TYPE 18: NOT DETECTED
HPVOH (OTHER TYPES): NOT DETECTED

## 2020-02-10 RX ORDER — ALBUTEROL SULFATE 90 UG/1
2 AEROSOL, METERED RESPIRATORY (INHALATION) EVERY 4 HOURS PRN
Qty: 1 INHALER | Refills: 5 | Status: SHIPPED | OUTPATIENT
Start: 2020-02-10

## 2020-04-05 ENCOUNTER — NURSE TRIAGE (OUTPATIENT)
Dept: OTHER | Facility: CLINIC | Age: 35
End: 2020-04-05

## 2020-04-05 NOTE — TELEPHONE ENCOUNTER
\"Do you have any heart or lung problems? Do you have a weak immune system? \" (e.g., CHF, COPD, asthma, HIV positive, chemotherapy, renal failure, diabetes mellitus, sickle cell anemia)        Embrel for sariatic arthritis, asthma (URI and exposure to smoke)    Protocols used: CORONAVIRUS (COVID-19) EXPOSURE-ADULT-AH

## 2020-05-05 ENCOUNTER — TELEPHONE (OUTPATIENT)
Dept: INTERNAL MEDICINE CLINIC | Age: 35
End: 2020-05-05

## 2020-05-05 ENCOUNTER — TELEMEDICINE (OUTPATIENT)
Dept: INTERNAL MEDICINE CLINIC | Age: 35
End: 2020-05-05
Payer: COMMERCIAL

## 2020-05-05 VITALS — BODY MASS INDEX: 45.34 KG/M2 | WEIGHT: 293 LBS

## 2020-05-05 PROBLEM — R60.0 BILATERAL LEG EDEMA: Status: ACTIVE | Noted: 2020-05-05

## 2020-05-05 PROCEDURE — 99214 OFFICE O/P EST MOD 30 MIN: CPT | Performed by: INTERNAL MEDICINE

## 2020-05-05 PROCEDURE — G8427 DOCREV CUR MEDS BY ELIG CLIN: HCPCS | Performed by: INTERNAL MEDICINE

## 2020-05-05 NOTE — PROGRESS NOTES
2020    TELEHEALTH EVALUATION -- Audio/Visual (During SDLWJ-95 public health emergency)    HPI:  Max Palomino (:  1985) has requested an audio/video evaluation for the following concern(s):    Edema: Patient complains of a 5 day(s) history of persistent edema which has been worsening with time. Location of edema: bilateral lower extremities (foot and ankle L > R). The edema is present in the evening. The swelling has been aggravated by dependency of involved area and use of medication such as OCP x3 months, relieved by support stockings, low-salt diet, and has been associated with weight gain. Patient denies nausea, vomiting, pruritis, abdominal pain, diarrhea, shortness of breath, jaundice, chest pain, palpitations and orthopnea/PND. Previous history of similar symptoms: Yes: with long flights. Risk factors:  oral contraceptives- progesterone only x3 months, FH PE, and obesity. Denies heart failure, prior thromboembolism, recent travel, recent surgery, recent immobilization, recent trauma, malignancy and nephrotic syndrome. Previous work-up: No.       Prior to Visit Medications    Medication Sig Taking? Authorizing Provider   norethindrone (ORTHO MICRONOR) 0.35 MG tablet Take 1 tablet by mouth daily Yes Elaina Peoples MD   Magnesium Bisglycinate 100 MG TABS Take 200 mg by mouth daily Yes Historical Provider, MD   Bismuth Subsalicylate (PEPTO-BISMOL) 262 MG TABS Take by mouth Yes Historical Provider, MD   pimecrolimus (ELIDEL) 1 % cream Apply topically 2 times daily.  Yes Richard Nogueira MD   RIBOFLAVIN PO Take 400 mg by mouth daily  Yes Historical Provider, MD   clindamycin (CLEOCIN T) 1 % external solution Apply to affected area BID Yes Duy Catalan MD   clobetasol (OLUX) 0.05 % foam APPLY TO AFFECTED AREA TWO TIMES A DAY AS NEEDED FOR FLARES Yes Duy Catalan MD   clobetasol (TEMOVATE) 0.05 % external solution APPLY TO THE AFFECTED AREA TWO TIMES A DAY AS NEEDED FOR FLARES Yes

## 2020-05-12 ENCOUNTER — TELEMEDICINE (OUTPATIENT)
Dept: INTERNAL MEDICINE CLINIC | Age: 35
End: 2020-05-12
Payer: COMMERCIAL

## 2020-05-12 VITALS — WEIGHT: 293 LBS | BODY MASS INDEX: 44.7 KG/M2

## 2020-05-12 PROCEDURE — 99213 OFFICE O/P EST LOW 20 MIN: CPT | Performed by: INTERNAL MEDICINE

## 2020-05-12 PROCEDURE — G8417 CALC BMI ABV UP PARAM F/U: HCPCS | Performed by: INTERNAL MEDICINE

## 2020-05-12 PROCEDURE — 1036F TOBACCO NON-USER: CPT | Performed by: INTERNAL MEDICINE

## 2020-05-12 PROCEDURE — G8427 DOCREV CUR MEDS BY ELIG CLIN: HCPCS | Performed by: INTERNAL MEDICINE

## 2020-05-12 NOTE — PROGRESS NOTES
Bilateral leg edema  Suspect progestin-only OCP is causing fluid retention and weight gain. She started this medication for headache prophylaxis, which has not been very effective, so is willing to discontinue. She will wear compression stockings consistently and keep legs elevated when sitting. If leg pain or increased swelling develops, will obtain LE dopplers. If negative for clot, may need short-term diuretic. 1. Bilateral leg edema  Much improved with discontinuation of OCP, low sodium diet, compression stockings and elevation- continue. Patient will call if symptoms change or worsen. Return in about 3 months (around 8/12/2020) for 30 MIN F/U. An  electronic signature was used to authenticate this note. --Maggy Lawson MD on 5/12/2020 at 11:32 AM    Pursuant to the emergency declaration under the [facility], 1135 waiver authority and the [employer] vascular solutionar General Act, this Virtual  Visit was conducted, with patient's consent, to reduce the patient's risk of exposure to COVID-19 and provide continuity of care for an established patient. Services were provided through a video synchronous discussion virtually to substitute for in-person clinic visit.

## 2021-02-13 ENCOUNTER — PATIENT MESSAGE (OUTPATIENT)
Dept: INTERNAL MEDICINE CLINIC | Age: 36
End: 2021-02-13

## 2021-02-13 DIAGNOSIS — E28.2 PCOS (POLYCYSTIC OVARIAN SYNDROME): Chronic | ICD-10-CM

## 2021-02-13 DIAGNOSIS — E78.00 PURE HYPERCHOLESTEROLEMIA: Primary | ICD-10-CM

## 2021-02-13 DIAGNOSIS — E03.4 HYPOTHYROIDISM DUE TO ACQUIRED ATROPHY OF THYROID: ICD-10-CM

## 2021-02-14 NOTE — TELEPHONE ENCOUNTER
From: Joseph Card  To: Gui Gottlieb MD  Sent: 2/13/2021 11:02 AM EST  Subject: Non-Urgent Medical Question    I am hoping to get bloodwork done this week hopefully before our appointment on Friday. I will be at Dr. Jose M Chavez office on Tuesday morning. If you could let me know what tests you want I will see if she can add them to her list. Thanks!

## 2021-02-19 ENCOUNTER — OFFICE VISIT (OUTPATIENT)
Dept: INTERNAL MEDICINE CLINIC | Age: 36
End: 2021-02-19
Payer: COMMERCIAL

## 2021-02-19 VITALS
DIASTOLIC BLOOD PRESSURE: 74 MMHG | HEART RATE: 68 BPM | SYSTOLIC BLOOD PRESSURE: 114 MMHG | WEIGHT: 293 LBS | HEIGHT: 69 IN | BODY MASS INDEX: 43.4 KG/M2

## 2021-02-19 DIAGNOSIS — Z11.59 NEED FOR HEPATITIS C SCREENING TEST: ICD-10-CM

## 2021-02-19 DIAGNOSIS — M54.6 CHRONIC BILATERAL THORACIC BACK PAIN: ICD-10-CM

## 2021-02-19 DIAGNOSIS — G89.29 CHRONIC BILATERAL THORACIC BACK PAIN: ICD-10-CM

## 2021-02-19 DIAGNOSIS — Z00.00 ANNUAL PHYSICAL EXAM: Primary | ICD-10-CM

## 2021-02-19 DIAGNOSIS — Z11.4 SCREENING FOR HUMAN IMMUNODEFICIENCY VIRUS: ICD-10-CM

## 2021-02-19 DIAGNOSIS — M54.2 NECK PAIN: ICD-10-CM

## 2021-02-19 PROCEDURE — G8484 FLU IMMUNIZE NO ADMIN: HCPCS | Performed by: INTERNAL MEDICINE

## 2021-02-19 PROCEDURE — 99395 PREV VISIT EST AGE 18-39: CPT | Performed by: INTERNAL MEDICINE

## 2021-02-19 RX ORDER — MELOXICAM 15 MG/1
15 TABLET ORAL DAILY
COMMUNITY
Start: 2020-06-18

## 2021-02-19 ASSESSMENT — ENCOUNTER SYMPTOMS
BACK PAIN: 1
GASTROINTESTINAL NEGATIVE: 1
RESPIRATORY NEGATIVE: 1
EYES NEGATIVE: 1

## 2021-02-19 NOTE — PROGRESS NOTES
2021    Raoul Reyna (: 1985) is a 38 y.o. female who presents for a preventive medicine evaluation. Patient Active Problem List   Diagnosis    Psoriatic arthritis ([facility] [address]    Depression    Anal fissure    Spider telangiectasis    PCOS (polycystic ovarian syndrome)    Hypothyroidism    Fatty liver disease, nonalcoholic    Chronic sinusitis    Morbid obesity with BMI of 40.0-44.9, adult (HCC)    Hyperlipidemia    Herniated lumbar intervertebral disc    Immunocompromised state ([facility] [address]    Seasonal allergic rhinitis    Shin splints    Vestibular migraine    Cyst of pineal gland    Mild intermittent asthma    Bilateral leg edema       Review of Systems   Constitutional: Negative. HENT: Negative. Eyes: Negative. Respiratory: Negative. Cardiovascular: Negative. Gastrointestinal: Negative. Genitourinary: Negative. Musculoskeletal: Positive for back pain (thoracic x9 months- no radicular symptoms) and neck pain (x9 months- intermittent stiffness and pain, no radicular symptoms). Skin: Negative. Neurological: Negative. Psychiatric/Behavioral: Negative. Allergies   Allergen Reactions    Erythromycin Other (See Comments)     cramps    Amoxicillin Rash    Sulfa Antibiotics Rash     Prior to Visit Medications    Medication Sig Taking? Authorizing Provider   Magnesium Bisglycinate 100 MG TABS Take 200 mg by mouth daily Yes Historical Provider, MD   Bismuth Subsalicylate (PEPTO-BISMOL) 262 MG TABS Take by mouth Yes Historical Provider, MD   pimecrolimus (ELIDEL) 1 % cream Apply topically 2 times daily.  Yes Shilpa Rivera MD   Coenzyme Q10 (CO Q-10 PO) Take by mouth Yes Historical Provider, MD   RIBOFLAVIN PO Take 400 mg by mouth daily  Yes Historical Provider, MD   clobetasol (TEMOVATE) 0.05 % external solution APPLY TO THE AFFECTED AREA TWO TIMES A DAY AS NEEDED FOR FLARES Yes Ap Hensley MD fluticasone (FLONASE SENSIMIST) 27.5 MCG/SPRAY nasal spray 2 sprays by Nasal route daily Yes Historical Provider, MD   fexofenadine (ALLEGRA ALLERGY) 180 MG tablet Take 1 tablet by mouth daily Yes Ruth Pineda MD   clobetasol (TEMOVATE) 0.05 % cream Apply to affected area BID PRN flares Yes Isma Salinas MD   Multiple Vitamin (MULTIVITAMINS PO) Take by mouth Yes Historical Provider, MD   etanercept (ENBREL) 50 MG/ML injection Inject 25 mg into the skin once Yes Historical Provider, MD   albuterol sulfate HFA (PROAIR HFA) 108 (90 Base) MCG/ACT inhaler Inhale 2 puffs into the lungs every 4 hours as needed for Wheezing  Patient not taking: Reported on 5/5/2020  Ruth Pineda MD   clindamycin (CLEOCIN T) 1 % external solution Apply to affected area BID  Patient not taking: Reported on 2/19/2021  Isma Salinas MD   clobetasol (OLUX) 0.05 % foam APPLY TO AFFECTED AREA TWO TIMES A DAY AS NEEDED FOR FLARES  Patient not taking: Reported on 2/19/2021  Isma Salinas MD   Calcium Carb-Cholecalciferol (CALTRATE 600+D) 600-800 MG-UNIT TABS Take by mouth  Historical Provider, MD        Past Medical History:   Diagnosis Date    Depression     Dysmenorrhea     Herniated lumbar intervertebral disc     L3, L5-S1 with impingement    Hormone disorder     Mild intermittent asthma 5/3/2019    Polycystic ovary syndrome     Psoriatic arthritis ([address]     Seasonal allergic rhinitis 10/13/2016    Thyroid disease      Past Surgical History:   Procedure Laterality Date    FOOT NEUROMA SURGERY  2007    Right  x2    WISDOM TOOTH EXTRACTION       Family History   Problem Relation Age of Onset    Diabetes Sister         Type I    Coronary Art Dis Maternal Grandfather         MI- 66's    Other Paternal Uncle         Aortic dissection, possible Marfan's    No Known Problems Mother     No Known Problems Father     No Known Problems Brother     No Known Problems Maternal Aunt     No Known Problems Maternal Uncle  Breast Cancer Paternal Aunt     No Known Problems Maternal Grandmother     No Known Problems Paternal Grandmother     No Known Problems Paternal Grandfather     No Known Problems Other     Rheum Arthritis Neg Hx     Osteoarthritis Neg Hx     Asthma Neg Hx     Cancer Neg Hx     Heart Failure Neg Hx     High Cholesterol Neg Hx     Hypertension Neg Hx     Migraines Neg Hx     Ovarian Cancer Neg Hx     Rashes/Skin Problems Neg Hx     Seizures Neg Hx     Stroke Neg Hx     Thyroid Disease Neg Hx        Vitals:    02/19/21 1514   BP: 114/74   Site: Right Upper Arm   Position: Sitting   Cuff Size: Large Adult   Pulse: 68   Weight: (!) 307 lb (139.3 kg)   Height: 5' 9\" (1.753 m)      Estimated body mass index is 45.34 kg/m² as calculated from the following:    Height as of this encounter: 5' 9\" (1.753 m). Weight as of this encounter: 307 lb (139.3 kg). Wt Readings from Last 3 Encounters:   02/19/21 (!) 307 lb (139.3 kg)   05/12/20 (!) 311 lb 8 oz (141.3 kg)   05/05/20 (!) 316 lb (143.3 kg)     BP Readings from Last 3 Encounters:   02/19/21 114/74   01/21/20 (!) 144/88   11/08/19 110/64     Physical Exam  Constitutional:       General: She is not in acute distress. Appearance: She is well-developed. HENT:      Head: Normocephalic and atraumatic. Right Ear: Tympanic membrane, ear canal and external ear normal.      Left Ear: Tympanic membrane, ear canal and external ear normal.   Eyes:      General: Lids are normal.      Conjunctiva/sclera: Conjunctivae normal.      Pupils: Pupils are equal, round, and reactive to light. Neck:      Musculoskeletal: Normal range of motion and neck supple. No muscular tenderness. Thyroid: No thyroid mass or thyromegaly. Vascular: No carotid bruit. Cardiovascular:      Rate and Rhythm: Normal rate and regular rhythm. Heart sounds: Normal heart sounds. No murmur. No friction rub. No gallop.     Pulmonary: -     She will schedule eye and dental exams   2. Chronic bilateral thoracic back pain  Suspect ergonomic issues at work, but could also be related to her psoriatic arthritis. She will discuss with her rheumatologist at upcoming appointment. 3. Neck pain  See plan for back pain. Return in about 6 months (around 8/19/2021) for 30 MIN F/U.    --Ezio Douglass MD on 2/19/2021 at 3:39 PM    An electronic signature was used to authenticate this note.

## 2021-08-22 PROBLEM — S86.899A SHIN SPLINTS: Status: RESOLVED | Noted: 2017-04-11 | Resolved: 2021-08-22

## 2021-08-23 NOTE — PROGRESS NOTES
Date of Visit:  2021    CC: Ursula Claros (: 1985) is a 43 y.o. female, established patient, here for evaluation/re-evaluation of the following medical concerns:    ASSESSMENT/PLAN:  Moderate episode of recurrent major depressive disorder ([facility] [address]  Assessment & Plan:  Recurrent, largely due to work stress. She has done well on Wellbutrin XL in the past, so will restart at 150-300 mg qam.  Patient will call for any significant medication side effects or worsening symptoms of depression. She was also provided with counseling resources to address her external stressors. Return in about 6 weeks (around 10/5/2021) for 30 MIN F/U- Video. Patient-Reported Vitals 2021   Patient-Reported Weight 312lb   Patient-Reported Height 5'9\"        Wt Readings from Last 3 Encounters:   21 (!) 307 lb (139.3 kg)   20 (!) 311 lb 8 oz (141.3 kg)   20 (!) 316 lb (143.3 kg)     BP Readings from Last 3 Encounters:   21 114/74   20 (!) 144/88   19 110/64     Estimated body mass index is 45.34 kg/m² as calculated from the following:    Height as of 21: 5' 9\" (1.753 m). Weight as of 21: 307 lb (139.3 kg). HPI  Mood Disorder:  Patient presents for follow-up of depression and anxiety disorder. Current complaints include: anhedonia, depressed mood, tearfulness, feelings of hopelessness, insomnia, fatigue, difficulty concentrating, irritability and increased use of drugs or alcohol: increased alcohol- 1-3 drinks/night. She denies feelings of worthlessness/excessive guilt, hypersomnia, excessive worry, restlessness, panic attacks and suicidal thoughts or behavior. Symptoms/signs of lance: none. External stressors: employment concern. Current treatment includes: none. ROS  As documented above    Physical Exam  Constitutional:       General: She is not in acute distress. Appearance: She is well-developed. HENT:      Head: Normocephalic and atraumatic. Mouth/Throat:      Lips: No lesions. Neck:      Comments: No visible mass  Pulmonary:      Effort: Pulmonary effort is normal. No respiratory distress. Skin:     Comments: No rash, erythema or other discoloration on facial skin and exposed areas of neck and upper extremites    Neurological:      Mental Status: She is alert. Comments: No facial asymmetry (cranial nerve 7 motor function) or gaze palsy   Psychiatric:         Attention and Perception: Attention normal.         Mood and Affect: Mood normal.         Speech: Speech normal.         Behavior: Behavior normal.         Thought Content: Thought content normal.         Cognition and Memory: Cognition normal.           Bhakti Cowan is a 43 y.o. female being evaluated by a Virtual Visit (video visit) encounter to address concerns as mentioned above. A caregiver was present when appropriate. Due to this being a TeleHealth encounter (During [facility] public health emergency), evaluation of the following organ systems was limited: Vitals/Constitutional/EENT/Resp/CV/GI//MS/Neuro/Skin/Heme-Lymph-Imm. Pursuant to the emergency declaration under the [address] [place] [address] and the [employer] Act, this Virtual Visit was conducted with patient's (and/or legal guardian's) consent, to reduce the patient's risk of exposure to COVID-19 and provide necessary medical care. The patient (and/or legal guardian) has also been advised to contact this office for worsening conditions or problems, and seek emergency medical treatment and/or call 911 if deemed necessary. Patient identification was verified at the start of the visit: Yes    Services were provided through a video synchronous discussion virtually to substitute for in-person clinic visit. Patient and provider were located at their individual homes.     --Refugio Guerrero MD on 8/24/2021 at 11:49 AM    An electronic signature was used to authenticate this note.

## 2021-08-24 ENCOUNTER — VIRTUAL VISIT (OUTPATIENT)
Dept: INTERNAL MEDICINE CLINIC | Age: 36
End: 2021-08-24
Payer: COMMERCIAL

## 2021-08-24 DIAGNOSIS — F33.1 MODERATE EPISODE OF RECURRENT MAJOR DEPRESSIVE DISORDER (HCC): Primary | Chronic | ICD-10-CM

## 2021-08-24 PROCEDURE — 99213 OFFICE O/P EST LOW 20 MIN: CPT | Performed by: INTERNAL MEDICINE

## 2021-08-24 PROCEDURE — G8427 DOCREV CUR MEDS BY ELIG CLIN: HCPCS | Performed by: INTERNAL MEDICINE

## 2021-08-24 RX ORDER — BUPROPION HYDROCHLORIDE 150 MG/1
150-300 TABLET ORAL EVERY MORNING
Qty: 60 TABLET | Refills: 2 | Status: SHIPPED | OUTPATIENT
Start: 2021-08-24 | End: 2022-08-16 | Stop reason: SDUPTHER

## 2021-08-24 NOTE — PATIENT INSTRUCTIONS
a. Private Insurance/Self-pay                Antonio Lomeli. Rola Mention  i. [place] Psychologist  ii. Private insurance and medicare  iii. [address] [facility], [address] [facility]  iv. [phone]  v. [email or web address]    o [facility]  i. Two locations  ii. [phone]  iii. [email or web address]    o [facility] of [address] [facility] [address] [facility], [address], Shyla Diaz [facility]  ii. [phone]  iii. [email or web address]    o [facility]       i. [email or web address]       ii. [phone]  iii. [address] [facility], [address]  iv. 100-155 per session  v. Individual, couples, and group counseling  vi. Do not bill insurance, but can provide you with bills that you can submit to your insurance to try to obtain reimbursement     o [facility] and [address] and [place] [employer]  ii. [address] [facility], [address] [place] [address] 3  iii. [phone]  iv. [email or web address]  v. [email or web address]    o [facility]  i. Mainly self-pay, but will at times offer services for those with financial limitations  ii. [address], [address]  iii. [address], [address] [facility] [address]   iv. [phone]  v. [email or web address]  vi. [email or web address]    o [facility]   i. Appears to be mainly self-pay, but may call to see if your insurance is accepted  ii. [address], [address]yla castellanos [facility]  iii. [phone]  iv. [email or web address]    o The [address] [place] [address]. They do not bill insurance, but will provide you with a receipt for you to try to get reimbursed  ii. Considered out-of-network providers  iii. [address] [facility], [facility]  iv. Phone: [phone]  v. [email or web address]/    o [facility]  i. Accept many private insurance plans  ii.  [place], [place], and [place] offices  iii. [phone]  iv. [email or web address]    o [facility]  i. Accepts many insurances and also offers self-pay discounts  ii. Many different locations across [place] and [place]  iii. [phone]  iv. [email or web address]    o [address] [facility]  i. Insurance/payment not mentioned on website  ii. [address], [address], Bessie, Jenn[address]  iii. [phone]  iv. Resident Research    o [facility]  i. Insurance/payment not mentioned on website  ii. [facility], [facility]  iii. [phone]  iv. [employer]are    o [employer] [facility]  i. Accepts many insurances  ii. [address] [facility], [address]  iii. [phone]  iv. [email or web address]. DoctorC    o [facility]   i. Will provide you with a bill that you can submit to insurance to try to get reimbursed  ii. Likely will be out-of-network provider  iii. [address] [facility], [facility], [address]  iv. [email or web address]    o [facility]  i. Appears to have a Trevoni affiliation  ii. Vikki  [facility], [address] [place] [address]  iii. [facility] location as well  iv. [phone]  v. [facility]. [address] [place] [address] Accepts many private insurances and Medicare  ii. [address] [facility], [address] [place] [address]  iii. [phone] ext. [phone] [facility] [address]  i. Accepts many private insurances  ii. [address] [facility] [address]  iii. [address] [facility] Accepts Medicare and other private insurances  ii. [address], [address]  iii. [phone]  iv. [email or web address].html    o Uziel Shelley  i. Accepts medicare and many private insurances  ii. [address], [address] [place] [address]  iii. [phone]    o [email or web address] [email or web address]  i.  Can utilize this website and filter by location and insurance

## 2022-08-15 NOTE — PROGRESS NOTES
Date of Visit:  2022    CC: Manuel Schultz (: 1985) is a 43 y.o. female, established patient, here for evaluation/re-evaluation of the following medical concerns:    ASSESSMENT/PLAN:  Moderate episode of recurrent major depressive disorder ([facility] [address]  Assessment & Plan:  Worse off medication. Will restart Wellbutrin XL at 150 mg qam- may increase to 300 mg after 2 weeks if no significant side effects. Patient will call for any significant medication adverse effects or worsening symptoms of depression. Fatty liver disease, nonalcoholic  Assessment & Plan:  Worse with recent dietary indiscretion. Importance of low fat, low carbohydrate diet and weight loss stressed to help prevent progression. She will continue to avoid excessive use of Tylenol and alcohol. Pure hypercholesterolemia  Assessment & Plan:  Importance of lifestyle changes stressed to help prevent need for cholesterol medication in the future. Orders:  -     Lipid, Fasting; Future  Morbid obesity with BMI of 40.0-44.9, adult [facility])  Assessment & Plan:  No significant improvement. Patient counseled on diet and exercise. Restart of Wellbutrin may be helpful in suppressing appetite. Numbness of finger  Assessment & Plan:  Etiology unclear- suspect ulnar neuropathy, but cannot rule out cervical radiculopathy. Will start evaluation with hand surgery referral- may need EMG. Orders:  -     Ramirez Alfred MD, Hand Surgery (Hand, Wrist, Upper Extremity), [facility]  Herniated lumbar intervertebral disc  Assessment & Plan:  Suspect progression from last MRI in , which would explain her back pain and LE paresthesias. Since no red flags, will refer to PT for 4-6 weeks, but if no significant improvement, will need repeat MRI.    Orders:  -     External Referral To Physical Therapy  Paresthesia of lower limb  Assessment & Plan:  Most consistent with lumbar radiculopathy- B12 deficiency and MS do not fit the clinical scenario very well, but will pursue further work-up if does not respond to PT. Orders:  -     External Referral To Physical Therapy     Return in about 6 weeks (around 9/27/2022). Vitals:    08/16/22 1539   BP: 126/80   Pulse: 68   SpO2: 100%   Weight: (!) 307 lb 9.6 oz (139.5 kg)   Height: 5' 10\" (1.778 m)      Estimated body mass index is 44.14 kg/m² as calculated from the following:    Height as of this encounter: 5' 10\" (1.778 m). Weight as of this encounter: 307 lb 9.6 oz (139.5 kg). Wt Readings from Last 3 Encounters:   08/16/22 (!) 307 lb 9.6 oz (139.5 kg)   02/19/21 (!) 307 lb (139.3 kg)   05/12/20 (!) 311 lb 8 oz (141.3 kg)     BP Readings from Last 3 Encounters:   08/16/22 126/80   02/19/21 114/74   01/21/20 (!) 144/88     HPI  Mood Disorder:  Patient presents for follow-up of depression. Current complaints include: hypersomnia, fatigue, changes in appetite/weight: increased, and difficulty concentrating. She denies anhedonia, depressed mood, tearfulness, feelings of hopelessness, feelings of worthlessness/excessive guilt, irritability, excessive worry, panic attacks, increased use of drugs or alcohol, and suicidal thoughts or behavior. Symptoms/signs of lance: none. External stressors: employment concern. Current treatment includes: none. Took Wellbutrin XL for about 2 months after last appointment, but stopped after moved temporarily to [place] and had difficulty getting script filled there. Medication side effects: none while taking. Fatty liver/obesity/hyperlipidemia:  Transaminases slightly elevated per recent labs per rheumatologist 7/19/22. No abdominal pain, N/V, brown urine. Diet has been sub-optimal over the past year. No regular exercise program, but more active. Chronic Back Pain: Pain is worse. On average, pain is perceived as moderate (4-6 pain scale). Change in quality of symptoms: yes - paresthesias now radiating into right lateral thigh and left foot.   Associated symptoms: paresthesias- as above . She denies weakness, saddle anesthesia, and new bowel or bladder dysfunction. Current treatment: on Enbrel and Mobic for psoriatic arthritis. Medication side effects: none. Diagnostic testing: MRI lumbar spine 10/12:  Left L3 neural impingement at L3-L4 level, disc herniation at L5-S1- appears to contact right L5 and S1 nerve roots. ROS  As documented above  Intermittent paresthesias of left 5th finger    Physical Exam  Constitutional:       General: She is not in acute distress. Appearance: She is well-developed. Eyes:      Conjunctiva/sclera: Conjunctivae normal.   Neck:      Thyroid: No thyroid mass or thyromegaly. Cardiovascular:      Rate and Rhythm: Normal rate and regular rhythm. Heart sounds: Normal heart sounds. No murmur heard. No friction rub. No gallop. Pulmonary:      Effort: Pulmonary effort is normal. No respiratory distress. Breath sounds: Normal breath sounds. No wheezing, rhonchi or rales. Musculoskeletal:      Right lower leg: No edema. Left lower leg: No edema. Comments: There is no tenderness over the lumbar spine and sacral spine, but some tenderness over left SI joint. Paraspinal muscle spasm/tenderness:  Yes, Right. Left 5th finger- nl ROM, NT, no significant deformity   Lymphadenopathy:      Cervical: No cervical adenopathy. Skin:     General: Skin is warm and dry. Findings: No erythema or rash. Neurological:      Mental Status: She is alert and oriented to person, place, and time. Comments: No sensory or motor deficit is noted. Deep tendon reflexes are 1+ and equal bilaterally. Straight leg raise is positive right. Psychiatric:         Mood and Affect: Mood normal.         Speech: Speech normal.         Behavior: Behavior normal.         Thought Content:  Thought content normal.         Cognition and Memory: Cognition normal.         Judgment: Judgment normal.     On this date 8/16/2022 I have spent 39 minutes reviewing previous notes, test results and face to face with the patient discussing the diagnosis and importance of compliance with the treatment plan as well as documenting on the day of the visit. --Antonio Tyson MD on 8/16/2022 at 5:26 PM    An electronic signature was used to authenticate this note.

## 2022-08-16 ENCOUNTER — OFFICE VISIT (OUTPATIENT)
Dept: INTERNAL MEDICINE CLINIC | Age: 37
End: 2022-08-16
Payer: COMMERCIAL

## 2022-08-16 VITALS
OXYGEN SATURATION: 100 % | DIASTOLIC BLOOD PRESSURE: 80 MMHG | HEIGHT: 70 IN | SYSTOLIC BLOOD PRESSURE: 126 MMHG | HEART RATE: 68 BPM | BODY MASS INDEX: 41.95 KG/M2 | WEIGHT: 293 LBS

## 2022-08-16 DIAGNOSIS — R20.0 NUMBNESS OF FINGER: ICD-10-CM

## 2022-08-16 DIAGNOSIS — E66.01 MORBID OBESITY WITH BMI OF 40.0-44.9, ADULT (HCC): ICD-10-CM

## 2022-08-16 DIAGNOSIS — M51.26 HERNIATED LUMBAR INTERVERTEBRAL DISC: ICD-10-CM

## 2022-08-16 DIAGNOSIS — E78.00 PURE HYPERCHOLESTEROLEMIA: ICD-10-CM

## 2022-08-16 DIAGNOSIS — K76.0 FATTY LIVER DISEASE, NONALCOHOLIC: ICD-10-CM

## 2022-08-16 DIAGNOSIS — R20.2 PARESTHESIA OF LOWER LIMB: ICD-10-CM

## 2022-08-16 DIAGNOSIS — F33.1 MODERATE EPISODE OF RECURRENT MAJOR DEPRESSIVE DISORDER (HCC): Primary | Chronic | ICD-10-CM

## 2022-08-16 PROCEDURE — 99215 OFFICE O/P EST HI 40 MIN: CPT | Performed by: INTERNAL MEDICINE

## 2022-08-16 PROCEDURE — G8417 CALC BMI ABV UP PARAM F/U: HCPCS | Performed by: INTERNAL MEDICINE

## 2022-08-16 PROCEDURE — 1036F TOBACCO NON-USER: CPT | Performed by: INTERNAL MEDICINE

## 2022-08-16 PROCEDURE — G8427 DOCREV CUR MEDS BY ELIG CLIN: HCPCS | Performed by: INTERNAL MEDICINE

## 2022-08-16 RX ORDER — CYCLOBENZAPRINE HCL 10 MG
10 TABLET ORAL 3 TIMES DAILY PRN
COMMUNITY

## 2022-08-16 RX ORDER — BUPROPION HYDROCHLORIDE 150 MG/1
150-300 TABLET ORAL EVERY MORNING
Qty: 180 TABLET | Refills: 1 | Status: SHIPPED | OUTPATIENT
Start: 2022-08-16

## 2022-08-16 SDOH — ECONOMIC STABILITY: FOOD INSECURITY: WITHIN THE PAST 12 MONTHS, YOU WORRIED THAT YOUR FOOD WOULD RUN OUT BEFORE YOU GOT MONEY TO BUY MORE.: NEVER TRUE

## 2022-08-16 SDOH — ECONOMIC STABILITY: FOOD INSECURITY: WITHIN THE PAST 12 MONTHS, THE FOOD YOU BOUGHT JUST DIDN'T LAST AND YOU DIDN'T HAVE MONEY TO GET MORE.: NEVER TRUE

## 2022-08-16 ASSESSMENT — PATIENT HEALTH QUESTIONNAIRE - PHQ9
3. TROUBLE FALLING OR STAYING ASLEEP: 1
9. THOUGHTS THAT YOU WOULD BE BETTER OFF DEAD, OR OF HURTING YOURSELF: 0
8. MOVING OR SPEAKING SO SLOWLY THAT OTHER PEOPLE COULD HAVE NOTICED. OR THE OPPOSITE, BEING SO FIGETY OR RESTLESS THAT YOU HAVE BEEN MOVING AROUND A LOT MORE THAN USUAL: 1
4. FEELING TIRED OR HAVING LITTLE ENERGY: 1
2. FEELING DOWN, DEPRESSED OR HOPELESS: 0
5. POOR APPETITE OR OVEREATING: 2
10. IF YOU CHECKED OFF ANY PROBLEMS, HOW DIFFICULT HAVE THESE PROBLEMS MADE IT FOR YOU TO DO YOUR WORK, TAKE CARE OF THINGS AT HOME, OR GET ALONG WITH OTHER PEOPLE: 1
7. TROUBLE CONCENTRATING ON THINGS, SUCH AS READING THE NEWSPAPER OR WATCHING TELEVISION: 3
SUM OF ALL RESPONSES TO PHQ QUESTIONS 1-9: 8
SUM OF ALL RESPONSES TO PHQ QUESTIONS 1-9: 8
6. FEELING BAD ABOUT YOURSELF - OR THAT YOU ARE A FAILURE OR HAVE LET YOURSELF OR YOUR FAMILY DOWN: 0
SUM OF ALL RESPONSES TO PHQ QUESTIONS 1-9: 8
SUM OF ALL RESPONSES TO PHQ QUESTIONS 1-9: 8
SUM OF ALL RESPONSES TO PHQ9 QUESTIONS 1 & 2: 0
1. LITTLE INTEREST OR PLEASURE IN DOING THINGS: 0

## 2022-08-16 ASSESSMENT — SOCIAL DETERMINANTS OF HEALTH (SDOH): HOW HARD IS IT FOR YOU TO PAY FOR THE VERY BASICS LIKE FOOD, HOUSING, MEDICAL CARE, AND HEATING?: SOMEWHAT HARD

## 2022-08-16 NOTE — ASSESSMENT & PLAN NOTE
Most consistent with lumbar radiculopathy- B12 deficiency and MS do not fit the clinical scenario very well, but will pursue further work-up if does not respond to PT.

## 2022-08-16 NOTE — ASSESSMENT & PLAN NOTE
Suspect progression from last MRI in 2012, which would explain her back pain and LE paresthesias. Since no red flags, will refer to PT for 4-6 weeks, but if no significant improvement, will need repeat MRI.

## 2022-08-16 NOTE — ASSESSMENT & PLAN NOTE
Worse off medication. Will restart Wellbutrin XL at 150 mg qam- may increase to 300 mg after 2 weeks if no significant side effects. Patient will call for any significant medication adverse effects or worsening symptoms of depression.

## 2022-08-16 NOTE — ASSESSMENT & PLAN NOTE
Importance of lifestyle changes stressed to help prevent need for cholesterol medication in the future.

## 2022-08-16 NOTE — ASSESSMENT & PLAN NOTE
Worse with recent dietary indiscretion. Importance of low fat, low carbohydrate diet and weight loss stressed to help prevent progression. She will continue to avoid excessive use of Tylenol and alcohol.

## 2022-08-16 NOTE — ASSESSMENT & PLAN NOTE
No significant improvement. Patient counseled on diet and exercise. Restart of Wellbutrin may be helpful in suppressing appetite.

## 2022-09-08 NOTE — TELEPHONE ENCOUNTER
Recommend starting with one-on-one PT at [facility]- Ngoc Ponce or Carlos Grimes, then can transition to Walt at [facility]. Follow up with your primary care doctor or a thoracic surgeon in 6-12 months for further evalaution of the right apical nodule.     Return to the ER for any new or other concerns.       Chest Pain    WHAT YOU NEED TO KNOW:    Chest pain can be caused by a range of conditions, from not serious to life-threatening. Chest pain can be a symptom of a digestive problem, such as acid reflux or a stomach ulcer. An anxiety attack or a strong emotion, such as anger, can also cause chest pain. Infection, inflammation, or a fracture in the bones or cartilage in your chest can cause pain or discomfort. Sometimes chest pain or pressure is caused by poor blood flow to your heart (angina). Chest pain may also be caused by life-threatening conditions such as a heart attack or blood clot in your lungs.     DISCHARGE INSTRUCTIONS:    Call 911 if:     You have any of the following signs of a heart attack:   Squeezing, pressure, or pain in your chest       and any of the following:   Discomfort or pain in your back, neck, jaw, stomach, or arm       Shortness of breath      Nausea or vomiting      Lightheadedness or a sudden cold sweat        Return to the emergency department if:     You have chest discomfort that gets worse, even with medicine.      You cough or vomit blood.       Your bowel movements are black or bloody.       You cannot stop vomiting, or it hurts to swallow.     Contact your healthcare provider if:     You have questions or concerns about your condition or care.        Medicines:     Medicines may be given to treat the cause of your chest pain. Examples include pain medicine, anxiety medicine, or medicines to increase blood flow to your heart.       Do not take certain medicines without asking your healthcare provider first. These include NSAIDs, herbal or vitamin supplements, or hormones (estrogen or progestin).       Take your medicine as directed. Contact your healthcare provider if you think your medicine is not helping or if you have side effects. Tell him or her if you are allergic to any medicine. Keep a list of the medicines, vitamins, and herbs you take. Include the amounts, and when and why you take them. Bring the list or the pill bottles to follow-up visits. Carry your medicine list with you in case of an emergency.    Follow up with your healthcare provider within 72 hours, or as directed: You may need to return for more tests to find the cause of your chest pain. You may be referred to a specialist, such as a cardiologist or gastroenterologist. Write down your questions so you remember to ask them during your visits.     Healthy living tips: The following are general healthy guidelines. If your chest pain is caused by a heart problem, your healthcare provider will give you specific guidelines to follow.    Do not smoke. Nicotine and other chemicals in cigarettes and cigars can cause lung and heart damage. Ask your healthcare provider for information if you currently smoke and need help to quit. E-cigarettes or smokeless tobacco still contain nicotine. Talk to your healthcare provider before you use these products.       Eat a variety of healthy, low-fat, low-salt foods. Healthy foods include fruits, vegetables, whole-grain breads, low-fat dairy products, beans, lean meats, and fish. Ask for more information about a heart healthy diet.      Drink plenty of water every day. Your body is made of mostly water. Water helps your body to control your temperature and blood pressure. Ask your healthcare provider how much water you should drink every day.      Ask about activity. Your healthcare provider will tell you which activities to limit or avoid. Ask when you can drive, return to work, and have sex. Ask about the best exercise plan for you.      Maintain a healthy weight. Ask your healthcare provider how much you should weigh. Ask him or her to help you create a weight loss plan if you are overweight.       Get the flu and pneumonia vaccines. All adults should get the influenza (flu) vaccine. Get it every year as soon as it becomes available. The pneumococcal vaccine is given to adults aged 65 years or older. The vaccine is given every 5 years to prevent pneumococcal disease, such as pneumonia.

## 2022-10-03 NOTE — PROGRESS NOTES
Date of Visit:  10/4/2022    CC: Fay Victoria (: 1985) is a 42 y.o. female, established patient, here for evaluation/re-evaluation of the following medical concerns:    ASSESSMENT/PLAN:  Herniated lumbar intervertebral disc  Assessment & Plan:  Steady improvement with PT- continue. If symptoms persist after maximal benefit achieved with PT, will obtain new MRI in anticipation of need for injections and/or surgery consult. Patient will call if symptoms change or worsen. Paresthesia of lower limb  Assessment & Plan:  Improvement in left foot, but unclear whether paresthesias of right thigh have changed. See plan for herniated lumbar disc. Orders:  -     Vitamin B12; Future  Numbness of finger  Assessment & Plan:  Improved with PT. She will schedule with hand surgeon if symptoms worsen. Morbid obesity with BMI of 40.0-44.9, adult [facility])  Assessment & Plan:  Modest improvement, but exercise has been limited by vertigo and LBP. She is working on optimizing her diet regimen and will advance her activity as tolerated. Orders:  -     T4, Free; Future  -     T3, Free; Future  -     Thyroid Peroxidase Antibody; Future  Recurrent major depressive disorder, in partial remission (HCC)  Assessment & Plan:  Improved on 300 mg dose of Wellbutrin XL, but may be contributing to her vertigo. She will try stopping this medication for 1 week- if no improvement, she will restart. If vertigo quickly resolves, consider switching to Cymbalta, which may also be helpful with her pain issues. Vertigo  Assessment & Plan:  Recurrent- prior episode not associated with Wellbutrin, but did improve with vestibular rehab. She is restarting those exercises and will also address with her current PT. 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        Return in about 6 weeks (around 11/15/2022).      Vitals:    10/04/22 1531   BP: 126/72 Pulse: 90   Resp: 12   SpO2: 98%   Weight: (!) 305 lb (138.3 kg)   Height: 5' 10\" (1.778 m)      Estimated body mass index is 43.76 kg/m² as calculated from the following:    Height as of this encounter: 5' 10\" (1.778 m). Weight as of this encounter: 305 lb (138.3 kg). Wt Readings from Last 3 Encounters:   10/04/22 (!) 305 lb (138.3 kg)   08/16/22 (!) 307 lb 9.6 oz (139.5 kg)   02/19/21 (!) 307 lb (139.3 kg)     BP Readings from Last 3 Encounters:   10/04/22 126/72   08/16/22 126/80   02/19/21 114/74     HPI  Mood Disorder:  Patient presents for follow-up of depression. Current complaints include: hypersomnia, fatigue, changes in appetite/weight: increased, and difficulty concentrating- improved on Wellbutrin XL. She denies anhedonia, depressed mood, tearfulness, feelings of hopelessness, feelings of worthlessness/excessive guilt, irritability, excessive worry, panic attacks, increased use of drugs or alcohol, and suicidal thoughts or behavior. Symptoms/signs of lance: none. External stressors: employment concern. Current treatment includes: Wellbutrin XL- 300 mg qam.   Medication side effects: vertigo? Chronic Back Pain: Pain is improved. On average, pain is perceived as mild-moderate. Change in quality of symptoms: Paresthesias radiating into right lateral thigh and left foot- left foot improved, unsure whether right thigh paresthesias have changed. Associated symptoms: paresthesias- as above . She denies weakness, saddle anesthesia, and new bowel or bladder dysfunction. Current treatment: on Enbrel and Mobic for psoriatic arthritis, PT- 5-6. Medication side effects: none. Diagnostic testing: MRI lumbar spine 10/12:  Left L3 neural impingement at L3-L4 level, disc herniation at L5-S1- appears to contact right L5 and S1 nerve roots. Paresthesia of left 5th finger:  Better with PT    ROS  As documented above    Physical Exam  Constitutional:       General: She is not in acute distress. Appearance: She is well-developed. Eyes:      Conjunctiva/sclera: Conjunctivae normal.   Neck:      Thyroid: No thyroid mass or thyromegaly. Cardiovascular:      Rate and Rhythm: Normal rate and regular rhythm. Heart sounds: Normal heart sounds. No murmur heard. No friction rub. No gallop. Pulmonary:      Effort: Pulmonary effort is normal. No respiratory distress. Breath sounds: Normal breath sounds. No wheezing, rhonchi or rales. Musculoskeletal:      Right lower leg: No edema. Left lower leg: No edema. Comments: There is no tenderness over the lumbar spine and sacral spine, but some tenderness over left SI joint. Paraspinal muscle spasm/tenderness:  Yes, left. Lymphadenopathy:      Cervical: No cervical adenopathy. Skin:     General: Skin is warm and dry. Findings: No erythema or rash. Neurological:      Mental Status: She is alert and oriented to person, place, and time. Comments: No sensory or motor deficit is noted. Deep tendon reflexes are 1+ and equal bilaterally. Straight leg raise is negative bilaterally. Psychiatric:         Mood and Affect: Mood normal.         Speech: Speech normal.         Behavior: Behavior normal.         Thought Content: Thought content normal.         Cognition and Memory: Cognition normal.         Judgment: Judgment normal.          --Cherie Trinidad MD on 10/4/2022 at 5:00 PM    An electronic signature was used to authenticate this note.

## 2022-10-04 ENCOUNTER — OFFICE VISIT (OUTPATIENT)
Dept: INTERNAL MEDICINE CLINIC | Age: 37
End: 2022-10-04
Payer: COMMERCIAL

## 2022-10-04 VITALS
BODY MASS INDEX: 41.95 KG/M2 | HEIGHT: 70 IN | DIASTOLIC BLOOD PRESSURE: 72 MMHG | OXYGEN SATURATION: 98 % | RESPIRATION RATE: 12 BRPM | WEIGHT: 293 LBS | SYSTOLIC BLOOD PRESSURE: 126 MMHG | HEART RATE: 90 BPM

## 2022-10-04 DIAGNOSIS — R20.0 NUMBNESS OF FINGER: ICD-10-CM

## 2022-10-04 DIAGNOSIS — R42 VERTIGO: ICD-10-CM

## 2022-10-04 DIAGNOSIS — F33.41 RECURRENT MAJOR DEPRESSIVE DISORDER, IN PARTIAL REMISSION (HCC): Chronic | ICD-10-CM

## 2022-10-04 DIAGNOSIS — M51.26 HERNIATED LUMBAR INTERVERTEBRAL DISC: Primary | ICD-10-CM

## 2022-10-04 DIAGNOSIS — E66.01 MORBID OBESITY WITH BMI OF 40.0-44.9, ADULT (HCC): ICD-10-CM

## 2022-10-04 DIAGNOSIS — R20.2 PARESTHESIA OF LOWER LIMB: ICD-10-CM

## 2022-10-04 PROCEDURE — 99214 OFFICE O/P EST MOD 30 MIN: CPT | Performed by: INTERNAL MEDICINE

## 2022-10-04 PROCEDURE — G8417 CALC BMI ABV UP PARAM F/U: HCPCS | Performed by: INTERNAL MEDICINE

## 2022-10-04 PROCEDURE — G8482 FLU IMMUNIZE ORDER/ADMIN: HCPCS | Performed by: INTERNAL MEDICINE

## 2022-10-04 PROCEDURE — G8427 DOCREV CUR MEDS BY ELIG CLIN: HCPCS | Performed by: INTERNAL MEDICINE

## 2022-10-04 PROCEDURE — 90674 CCIIV4 VAC NO PRSV 0.5 ML IM: CPT | Performed by: INTERNAL MEDICINE

## 2022-10-04 PROCEDURE — 1036F TOBACCO NON-USER: CPT | Performed by: INTERNAL MEDICINE

## 2022-10-04 PROCEDURE — 90471 IMMUNIZATION ADMIN: CPT | Performed by: INTERNAL MEDICINE

## 2022-10-04 RX ORDER — CLOBETASOL PROPIONATE 0.5 MG/G
CREAM TOPICAL
Qty: 60 G | Refills: 6 | Status: SHIPPED | OUTPATIENT
Start: 2022-10-04

## 2022-10-04 NOTE — ASSESSMENT & PLAN NOTE
Improvement in left foot, but unclear whether paresthesias of right thigh have changed. See plan for herniated lumbar disc.

## 2022-10-04 NOTE — ASSESSMENT & PLAN NOTE
Steady improvement with PT- continue. If symptoms persist after maximal benefit achieved with PT, will obtain new MRI in anticipation of need for injections and/or surgery consult. Patient will call if symptoms change or worsen.

## 2022-10-04 NOTE — ASSESSMENT & PLAN NOTE
Improved on 300 mg dose of Wellbutrin XL, but may be contributing to her vertigo. She will try stopping this medication for 1 week- if no improvement, she will restart. If vertigo quickly resolves, consider switching to Cymbalta, which may also be helpful with her pain issues.

## 2022-10-04 NOTE — ASSESSMENT & PLAN NOTE
Modest improvement, but exercise has been limited by vertigo and LBP. She is working on optimizing her diet regimen and will advance her activity as tolerated.

## 2022-10-04 NOTE — ASSESSMENT & PLAN NOTE
Recurrent- prior episode not associated with Wellbutrin, but did improve with vestibular rehab. She is restarting those exercises and will also address with her current PT.   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

## 2022-11-10 NOTE — PROGRESS NOTES
Date of Visit:  11/15/2022    CC: Josey Tang (: 1985) is a 42 y.o. female, established patient, here for evaluation/re-evaluation of the following medical concerns:    ASSESSMENT/PLAN:  Herniated lumbar intervertebral disc  Assessment & Plan:  Improved with PT- continue home exercises and therapeutic massage. Neck pain  Assessment & Plan:  Improving with PT and massage- continue. Orders:  -     External Referral To Massage Therapy  Paresthesia of lower limb  Assessment & Plan:  Improved with PT- continue home exercises. If symptoms worsen, will refer for new MRI of LS spine. Vertigo  Assessment & Plan:  Improved with deep tissue massage of neck, but now waxing and waning. Continue regular massage, but will add PT for vestibular rehab, which was effective in the past.  If vertigo becomes persistently worse, or new neurologic develop, will order new brain MRI. Orders:  -     External Referral To Physical Therapy  -     External Referral To Massage Therapy  Recurrent major depressive disorder, in partial remission ([facility] [address]  Assessment & Plan:  No change in vertigo with discontinuation of Wellbutrin XL, so restarted 1 week ago at 150 mg qam, which significant improvement in fatigue, increased appetite, and difficulty concentrating. Continue 150 mg dose, but may increase to 300 mg, if needed. Return in about 6 months (around 5/15/2023). Vitals:    11/15/22 1522   BP: 129/76   Pulse: 85   SpO2: 98%   Weight: (!) 303 lb 12.8 oz (137.8 kg)   Height: 5' 10\" (1.778 m)      Estimated body mass index is 43.59 kg/m² as calculated from the following:    Height as of this encounter: 5' 10\" (1.778 m). Weight as of this encounter: 303 lb 12.8 oz (137.8 kg).      Wt Readings from Last 3 Encounters:   11/15/22 (!) 303 lb 12.8 oz (137.8 kg)   10/04/22 (!) 305 lb (138.3 kg)   22 (!) 307 lb 9.6 oz (139.5 kg)     BP Readings from Last 3 Encounters:   11/15/22 129/76   10/04/22 126/72   22 126/80     HPI  Mood Disorder:  Patient presents for follow-up of depression. Current complaints include: hypersomnia, fatigue, changes in appetite/weight: increased, and difficulty concentrating- all improved with restart of Wellbutrin. She denies anhedonia, depressed mood, tearfulness, feelings of hopelessness, feelings of worthlessness/excessive guilt, irritability, excessive worry, panic attacks, increased use of drugs or alcohol, and suicidal thoughts or behavior. Symptoms/signs of lance: none. External stressors: employment concern. Current treatment includes: held Wellbutrin XL for 1 week due to concerns about vertigo- no change, so restarted 150 mg about 1 week ago. Chronic Back Pain: Pain is better. On average, pain is perceived as mild-moderate. Change in quality of symptoms: Paresthesias radiating into right lateral thigh and left foot- left foot and right thigh paresthesias improved. Associated symptoms: paresthesias- as above . She denies weakness, saddle anesthesia, and new bowel or bladder dysfunction. Current treatment: on Enbrel and Mobic for psoriatic arthritis, PT- 8 visits, 1 massage session. Medication side effects: none. Diagnostic testing: MRI lumbar spine 10/12:  Left L3 neural impingement at L3-L4 level, disc herniation at L5-S1- appears to contact right L5 and S1 nerve roots. ROS  As documented above    Physical Exam  Constitutional:       General: She is not in acute distress. Appearance: She is well-developed. Eyes:      Conjunctiva/sclera: Conjunctivae normal.   Cardiovascular:      Rate and Rhythm: Normal rate and regular rhythm. Heart sounds: Normal heart sounds. No murmur heard. No friction rub. No gallop. Pulmonary:      Effort: Pulmonary effort is normal. No respiratory distress. Breath sounds: Normal breath sounds. No wheezing, rhonchi or rales. Musculoskeletal:      Cervical back: Muscular tenderness (right trapezius spasm) present.  No spinous process tenderness. Decreased range of motion (rotation and lateral bending R > L). Right lower leg: No edema. Left lower leg: No edema. Comments: There is no tenderness over the lumbar spine and sacral spine, but minimal tenderness over left SI joint. Paraspinal muscle spasm/tenderness:  No.    Skin:     General: Skin is warm and dry. Findings: No erythema or rash. Neurological:      Mental Status: She is alert and oriented to person, place, and time. Comments: No sensory or motor deficit is noted. Deep tendon reflexes are 1+ and equal bilaterally. Straight leg raise is negative bilaterally. Psychiatric:         Mood and Affect: Mood normal.         Speech: Speech normal.         Behavior: Behavior normal.         Thought Content: Thought content normal.         Cognition and Memory: Cognition normal.         Judgment: Judgment normal.           --Kenna Snellen, MD on 11/15/2022 at 4:13 PM    An electronic signature was used to authenticate this note.

## 2022-11-15 ENCOUNTER — OFFICE VISIT (OUTPATIENT)
Dept: INTERNAL MEDICINE CLINIC | Age: 37
End: 2022-11-15
Payer: COMMERCIAL

## 2022-11-15 VITALS
WEIGHT: 293 LBS | DIASTOLIC BLOOD PRESSURE: 76 MMHG | SYSTOLIC BLOOD PRESSURE: 129 MMHG | OXYGEN SATURATION: 98 % | HEART RATE: 85 BPM | BODY MASS INDEX: 41.95 KG/M2 | HEIGHT: 70 IN

## 2022-11-15 DIAGNOSIS — M54.2 NECK PAIN: ICD-10-CM

## 2022-11-15 DIAGNOSIS — M51.26 HERNIATED LUMBAR INTERVERTEBRAL DISC: Primary | ICD-10-CM

## 2022-11-15 DIAGNOSIS — R42 VERTIGO: ICD-10-CM

## 2022-11-15 DIAGNOSIS — F33.41 RECURRENT MAJOR DEPRESSIVE DISORDER, IN PARTIAL REMISSION (HCC): Chronic | ICD-10-CM

## 2022-11-15 DIAGNOSIS — R20.2 PARESTHESIA OF LOWER LIMB: ICD-10-CM

## 2022-11-15 PROBLEM — R20.0 NUMBNESS OF FINGER: Status: RESOLVED | Noted: 2022-08-16 | Resolved: 2022-11-15

## 2022-11-15 PROCEDURE — 99214 OFFICE O/P EST MOD 30 MIN: CPT | Performed by: INTERNAL MEDICINE

## 2022-11-15 PROCEDURE — 1036F TOBACCO NON-USER: CPT | Performed by: INTERNAL MEDICINE

## 2022-11-15 PROCEDURE — G8427 DOCREV CUR MEDS BY ELIG CLIN: HCPCS | Performed by: INTERNAL MEDICINE

## 2022-11-15 PROCEDURE — G8417 CALC BMI ABV UP PARAM F/U: HCPCS | Performed by: INTERNAL MEDICINE

## 2022-11-15 PROCEDURE — G8482 FLU IMMUNIZE ORDER/ADMIN: HCPCS | Performed by: INTERNAL MEDICINE

## 2022-11-15 NOTE — ASSESSMENT & PLAN NOTE
No change in vertigo with discontinuation of Wellbutrin XL, so restarted 1 week ago at 150 mg qam, which significant improvement in fatigue, increased appetite, and difficulty concentrating. Continue 150 mg dose, but may increase to 300 mg, if needed.

## 2022-11-15 NOTE — ASSESSMENT & PLAN NOTE
Improved with deep tissue massage of neck, but now waxing and waning. Continue regular massage, but will add PT for vestibular rehab, which was effective in the past.  If vertigo becomes persistently worse, or new neurologic develop, will order new brain MRI.

## 2023-02-22 ENCOUNTER — E-VISIT (OUTPATIENT)
Dept: INTERNAL MEDICINE CLINIC | Age: 38
End: 2023-02-22
Payer: COMMERCIAL

## 2023-02-22 DIAGNOSIS — U07.1 COVID-19 VIRUS INFECTION: Primary | ICD-10-CM

## 2023-02-22 PROCEDURE — 99421 OL DIG E/M SVC 5-10 MIN: CPT | Performed by: INTERNAL MEDICINE

## 2023-02-22 RX ORDER — ALBUTEROL SULFATE 90 UG/1
2 AEROSOL, METERED RESPIRATORY (INHALATION) EVERY 4 HOURS PRN
Qty: 1 EACH | Refills: 5 | Status: SHIPPED | OUTPATIENT
Start: 2023-02-22

## 2023-02-22 ASSESSMENT — LIFESTYLE VARIABLES: SMOKING_STATUS: NO, I HAVE NEVER SMOKED

## 2023-02-22 NOTE — LETTER
[address] [facility]  [address] [facility] [address]  Phone: [phone]  Fax: [phone]    Abdias Dinero MD        February 22, 2023    [place] [id number] [place] [address] [address]      To whom it may concern:    Braxton Strickland tested positive for COVID on 2/18/23, so should be excused from work until at least 2/23/23. She was provided with CDC isolation and masking guidelines, which will dictate her actual return to work date.       Sincerely,    Abdias Dinero MD  Electronically signed by Abdias Dinero MD on 2/22/2023 at 3:41 PM

## 2023-02-22 NOTE — PROGRESS NOTES
HPI: as per patient provided history  Exam: N/A (electronic visit)  ASSESSMENT/PLAN:  1. COVID-19 virus infection  At higher risk for complications and hospitalization, but outside the 5 day window for anti-viral treatment. Supportive care and isolation guidelines provided. Work note provided. - albuterol sulfate HFA (PROAIR HFA) 108 (90 Base) MCG/ACT inhaler; Inhale 2 puffs into the lungs every 4 hours as needed for Wheezing  Dispense: 1 each; Refill: 5    Patient instructed to call the office if worsens, or fails to improve as anticipated. 5-10 minutes were spent on the digital evaluation and management of this patient.

## 2023-03-20 RX ORDER — BUPROPION HYDROCHLORIDE 150 MG/1
TABLET ORAL
Qty: 180 TABLET | Refills: 1 | Status: SHIPPED | OUTPATIENT
Start: 2023-03-20

## 2023-03-20 NOTE — TELEPHONE ENCOUNTER
Medication:   Requested Prescriptions     Pending Prescriptions Disp Refills    buPROPion (WELLBUTRIN XL) 150 MG extended release tablet [Pharmacy Med Name: buPROPion HCl ER (XL) 150 MG Oral Tablet Extended Release 24 Hour] 180 tablet 3     Sig: TAKE 1 TO 2 TABLETS BY  MOUTH IN THE MORNING     Last Filled:  #180 with 1 refill on 8/16/22    Last appt: 2/22/2023   Next appt: 5/19/2023    Last OARRS: No flowsheet data found.

## 2023-05-17 ENCOUNTER — PATIENT MESSAGE (OUTPATIENT)
Dept: INTERNAL MEDICINE CLINIC | Age: 38
End: 2023-05-17

## 2023-05-17 DIAGNOSIS — L40.50 PSORIATIC ARTHRITIS (HCC): Chronic | ICD-10-CM

## 2023-05-17 DIAGNOSIS — E66.01 MORBID OBESITY WITH BMI OF 40.0-44.9, ADULT (HCC): ICD-10-CM

## 2023-05-17 DIAGNOSIS — E78.00 PURE HYPERCHOLESTEROLEMIA: Primary | ICD-10-CM

## 2023-05-17 NOTE — PROGRESS NOTES
Date of Visit:  2023    CC: Khai Cain (: 1985) is a 42 y.o. female, established patient, here for evaluation/re-evaluation of the following medical concerns:    ASSESSMENT/PLAN:  Recurrent major depressive disorder, in full remission ([facility] [address]  Assessment & Plan:  Mood symptoms much improved on higher dose of Wellbutrin XL, but fatigue persists and concentration is worse. Will try increasing Wellbutrin XL to 450 mg qam, but will also refer to psychiatry for medication consult. Patient will call for any significant medication side effects or worsening symptoms of depression. Orders:  -     Ambulatory referral to Psychiatry  Difficulty concentrating  Assessment & Plan:  Diagnosed informally with ADD by therapist several years ago, but never treated with medication. If higher dose of Wellbutrin XL does not result in significant improvement, she will discuss other treatment options, including stimulant, with Dr. Roman Tabares. Orders:  -     Ambulatory referral to Psychiatry  Morbid obesity with BMI of 40.0-44.9, adult [facility])  Assessment & Plan:  Improved on higher dose of Wellbutrin XL, which has helped her to adhere to lifestyle changes. Patient counseled on diet and exercise. Herniated lumbar intervertebral disc  Assessment & Plan:  Improved with PT- continue home exercises, massage therapy and prn flexeril. Will refer back to PT if symptoms worsen. Neck pain  Assessment & Plan:  Improved with PT, massage and recent change in glasses prescription, which has also resolved her vertigo. Continue PT exercises, massage and prn flexeril for any recurrent neck spasms. Paresthesia of lower limb  Assessment & Plan:  Improved. See plan for herniated lumbar disc. Return in about 6 months (around 2023) for CPE.      Vitals:    23 1612   BP: 112/74   Site: Right Upper Arm   Position: Sitting   Cuff Size: Large Adult   Pulse: 73   SpO2: 97%   Weight: 289 lb (131.1 kg)      Estimated body

## 2023-05-18 NOTE — TELEPHONE ENCOUNTER
Called   Gucci Logan (Self) [phone] (H)     The message stated that the person you are trying to reach has a voicemail box that has not been set up yet.  Please try your call again later.    Jenelle Mcqueen RN [facility] Triage.     From: Khai Cain  To: Dr. Leidy Amaral  Sent: 5/17/2023 6:00 PM EDT  Subject: Blood work for tomorrows appointment     Would it be possible to get the order for my standard bloodwork ahead of my appointment? Im going to try to get in early so I can get the bloodwork done before the appointment.  Thanks

## 2023-05-19 ENCOUNTER — OFFICE VISIT (OUTPATIENT)
Dept: INTERNAL MEDICINE CLINIC | Age: 38
End: 2023-05-19
Payer: COMMERCIAL

## 2023-05-19 VITALS
DIASTOLIC BLOOD PRESSURE: 74 MMHG | SYSTOLIC BLOOD PRESSURE: 112 MMHG | BODY MASS INDEX: 41.47 KG/M2 | OXYGEN SATURATION: 97 % | HEART RATE: 73 BPM | WEIGHT: 289 LBS

## 2023-05-19 DIAGNOSIS — E78.00 PURE HYPERCHOLESTEROLEMIA: ICD-10-CM

## 2023-05-19 DIAGNOSIS — M51.26 HERNIATED LUMBAR INTERVERTEBRAL DISC: ICD-10-CM

## 2023-05-19 DIAGNOSIS — E66.01 MORBID OBESITY WITH BMI OF 40.0-44.9, ADULT (HCC): ICD-10-CM

## 2023-05-19 DIAGNOSIS — M54.2 NECK PAIN: ICD-10-CM

## 2023-05-19 DIAGNOSIS — L40.50 PSORIATIC ARTHRITIS (HCC): Chronic | ICD-10-CM

## 2023-05-19 DIAGNOSIS — F33.42 RECURRENT MAJOR DEPRESSIVE DISORDER, IN FULL REMISSION (HCC): Primary | Chronic | ICD-10-CM

## 2023-05-19 DIAGNOSIS — R20.2 PARESTHESIA OF LOWER LIMB: ICD-10-CM

## 2023-05-19 DIAGNOSIS — R41.840 DIFFICULTY CONCENTRATING: ICD-10-CM

## 2023-05-19 PROBLEM — R42 VERTIGO: Status: RESOLVED | Noted: 2022-10-04 | Resolved: 2023-05-19

## 2023-05-19 LAB
BASOPHILS # BLD: 0 K/UL (ref 0–0.2)
BASOPHILS NFR BLD: 0.3 %
DEPRECATED RDW RBC AUTO: 13.9 % (ref 12.4–15.4)
EOSINOPHIL # BLD: 0.1 K/UL (ref 0–0.6)
EOSINOPHIL NFR BLD: 1.9 %
HCT VFR BLD AUTO: 35.8 % (ref 36–48)
HGB BLD-MCNC: 12.4 G/DL (ref 12–16)
LYMPHOCYTES # BLD: 1.7 K/UL (ref 1–5.1)
LYMPHOCYTES NFR BLD: 34.3 %
MCH RBC QN AUTO: 30.3 PG (ref 26–34)
MCHC RBC AUTO-ENTMCNC: 34.7 G/DL (ref 31–36)
MCV RBC AUTO: 87.3 FL (ref 80–100)
MONOCYTES # BLD: 0.4 K/UL (ref 0–1.3)
MONOCYTES NFR BLD: 7.5 %
NEUTROPHILS # BLD: 2.8 K/UL (ref 1.7–7.7)
NEUTROPHILS NFR BLD: 56 %
PLATELET # BLD AUTO: 174 K/UL (ref 135–450)
PMV BLD AUTO: 8.7 FL (ref 5–10.5)
RBC # BLD AUTO: 4.11 M/UL (ref 4–5.2)
WBC # BLD AUTO: 5 K/UL (ref 4–11)

## 2023-05-19 PROCEDURE — 99214 OFFICE O/P EST MOD 30 MIN: CPT | Performed by: INTERNAL MEDICINE

## 2023-05-19 PROCEDURE — G8417 CALC BMI ABV UP PARAM F/U: HCPCS | Performed by: INTERNAL MEDICINE

## 2023-05-19 PROCEDURE — G8427 DOCREV CUR MEDS BY ELIG CLIN: HCPCS | Performed by: INTERNAL MEDICINE

## 2023-05-19 PROCEDURE — 1036F TOBACCO NON-USER: CPT | Performed by: INTERNAL MEDICINE

## 2023-05-19 SDOH — ECONOMIC STABILITY: HOUSING INSECURITY
IN THE LAST 12 MONTHS, WAS THERE A TIME WHEN YOU DID NOT HAVE A STEADY PLACE TO SLEEP OR SLEPT IN A SHELTER (INCLUDING NOW)?: NO

## 2023-05-19 SDOH — ECONOMIC STABILITY: FOOD INSECURITY: WITHIN THE PAST 12 MONTHS, YOU WORRIED THAT YOUR FOOD WOULD RUN OUT BEFORE YOU GOT MONEY TO BUY MORE.: NEVER TRUE

## 2023-05-19 SDOH — ECONOMIC STABILITY: FOOD INSECURITY: WITHIN THE PAST 12 MONTHS, THE FOOD YOU BOUGHT JUST DIDN'T LAST AND YOU DIDN'T HAVE MONEY TO GET MORE.: NEVER TRUE

## 2023-05-19 SDOH — ECONOMIC STABILITY: INCOME INSECURITY: HOW HARD IS IT FOR YOU TO PAY FOR THE VERY BASICS LIKE FOOD, HOUSING, MEDICAL CARE, AND HEATING?: SOMEWHAT HARD

## 2023-05-19 ASSESSMENT — PATIENT HEALTH QUESTIONNAIRE - PHQ9
1. LITTLE INTEREST OR PLEASURE IN DOING THINGS: 0
SUM OF ALL RESPONSES TO PHQ QUESTIONS 1-9: 4
6. FEELING BAD ABOUT YOURSELF - OR THAT YOU ARE A FAILURE OR HAVE LET YOURSELF OR YOUR FAMILY DOWN: 0
8. MOVING OR SPEAKING SO SLOWLY THAT OTHER PEOPLE COULD HAVE NOTICED. OR THE OPPOSITE, BEING SO FIGETY OR RESTLESS THAT YOU HAVE BEEN MOVING AROUND A LOT MORE THAN USUAL: 0
5. POOR APPETITE OR OVEREATING: 0
SUM OF ALL RESPONSES TO PHQ QUESTIONS 1-9: 4
SUM OF ALL RESPONSES TO PHQ QUESTIONS 1-9: 4
2. FEELING DOWN, DEPRESSED OR HOPELESS: 0
7. TROUBLE CONCENTRATING ON THINGS, SUCH AS READING THE NEWSPAPER OR WATCHING TELEVISION: 3
SUM OF ALL RESPONSES TO PHQ9 QUESTIONS 1 & 2: 0
9. THOUGHTS THAT YOU WOULD BE BETTER OFF DEAD, OR OF HURTING YOURSELF: 0
3. TROUBLE FALLING OR STAYING ASLEEP: 1
10. IF YOU CHECKED OFF ANY PROBLEMS, HOW DIFFICULT HAVE THESE PROBLEMS MADE IT FOR YOU TO DO YOUR WORK, TAKE CARE OF THINGS AT HOME, OR GET ALONG WITH OTHER PEOPLE: 1
SUM OF ALL RESPONSES TO PHQ QUESTIONS 1-9: 4
4. FEELING TIRED OR HAVING LITTLE ENERGY: 0

## 2023-05-19 NOTE — ASSESSMENT & PLAN NOTE
Mood symptoms much improved on higher dose of Wellbutrin XL, but fatigue persists and concentration is worse. Will try increasing Wellbutrin XL to 450 mg qam, but will also refer to psychiatry for medication consult. Patient will call for any significant medication side effects or worsening symptoms of depression.

## 2023-05-19 NOTE — ASSESSMENT & PLAN NOTE
Improved with PT- continue home exercises, massage therapy and prn flexeril. Will refer back to PT if symptoms worsen.

## 2023-05-19 NOTE — ASSESSMENT & PLAN NOTE
Improved with PT, massage and recent change in glasses prescription, which has also resolved her vertigo. Continue PT exercises, massage and prn flexeril for any recurrent neck spasms.

## 2023-05-19 NOTE — ASSESSMENT & PLAN NOTE
Diagnosed informally with ADD by therapist several years ago, but never treated with medication. If higher dose of Wellbutrin XL does not result in significant improvement, she will discuss other treatment options, including stimulant, with Dr. Reuben Olson.

## 2023-05-19 NOTE — ASSESSMENT & PLAN NOTE
Improved on higher dose of Wellbutrin XL, which has helped her to adhere to lifestyle changes. Patient counseled on diet and exercise.

## 2023-05-20 LAB
ALBUMIN SERPL-MCNC: 4.2 G/DL (ref 3.4–5)
ALBUMIN/GLOB SERPL: 1.7 {RATIO} (ref 1.1–2.2)
ALP SERPL-CCNC: 59 U/L (ref 40–129)
ALT SERPL-CCNC: 35 U/L (ref 10–40)
ANION GAP SERPL CALCULATED.3IONS-SCNC: 12 MMOL/L (ref 3–16)
AST SERPL-CCNC: 29 U/L (ref 15–37)
BILIRUB SERPL-MCNC: 0.4 MG/DL (ref 0–1)
BUN SERPL-MCNC: 10 MG/DL (ref 7–20)
CALCIUM SERPL-MCNC: 8.8 MG/DL (ref 8.3–10.6)
CHLORIDE SERPL-SCNC: 101 MMOL/L (ref 99–110)
CHOLEST SERPL-MCNC: 169 MG/DL (ref 0–199)
CO2 SERPL-SCNC: 24 MMOL/L (ref 21–32)
CREAT SERPL-MCNC: 0.8 MG/DL (ref 0.6–1.1)
GFR SERPLBLD CREATININE-BSD FMLA CKD-EPI: >60 ML/MIN/{1.73_M2}
GLUCOSE P FAST SERPL-MCNC: 80 MG/DL (ref 70–99)
HDLC SERPL-MCNC: 44 MG/DL (ref 40–60)
LDL CHOLESTEROL CALCULATED: 101 MG/DL
POTASSIUM SERPL-SCNC: 4 MMOL/L (ref 3.5–5.1)
PROT SERPL-MCNC: 6.7 G/DL (ref 6.4–8.2)
SODIUM SERPL-SCNC: 137 MMOL/L (ref 136–145)
TRIGL SERPL-MCNC: 122 MG/DL (ref 0–150)
TSH SERPL DL<=0.005 MIU/L-ACNC: 2.27 UIU/ML (ref 0.27–4.2)
VLDLC SERPL CALC-MCNC: 24 MG/DL

## 2023-05-21 LAB
EST. AVERAGE GLUCOSE BLD GHB EST-MCNC: 91.1 MG/DL
HBA1C MFR BLD: 4.8 %

## 2023-07-06 NOTE — PROGRESS NOTES
PSYCHIATRY INITIAL EVALUATION    Ivon Paige  1985  07/10/2023  Face to Face time: 60 minutes  PCP: Sherrie Willson MD    CC: New Patient      ASSESSMENT:   Patient is a 42 y.o. female with significant PMH of PCOS, psoriatic arthritis, HERRERA, hyperlipidemia, and migraines who presents to the outpatient psychiatric clinic today for evaluation and management of ADHD. Patient's presentation today is indicative of the singular diagnosis of ADHD, combined type. This is not a new diagnosis for the patient, having been diagnosed reportedly as early as age 14, however the patient reported having not trialed medications for this specifically in a long time. Her current difficulties are well-outlined in the note below and are consistent with the objective measures seen during the MSE. Diagnosis:  ADHD, combined type    PLAN:   1. Discussed with patient potential management options further conditions including medication management as well as nonpharmacologic strategies. Patient on board with current plan of care. 2.  Continue bupropion xl 450mg daily. 3. Add methylphenidate IR 10mg bid. Patient was cautioned regarding potential side effects of stimulants including headaches, appetite suppression, insomnia, and hypertension. Pt was advised not to take any doses of medication later than 3pm to try and avoid insomnia SE. Patient was also cautioned regarding such severe side effects as stimulant induced lance as well as stimulant induced psychosis and advised to seek medical help should some of the symptoms present themselves.       Medication Monitoring:    - PDMP reviewed: No medications on file       Follow-up: RTC in 4 weeks    Safety: Pt was counseled on the potential for increased suicidal ideations and advised on potential options for dealing with these including hotlines, calling the office, or going to the nearest emergency

## 2023-07-10 ENCOUNTER — OFFICE VISIT (OUTPATIENT)
Dept: PSYCHIATRY | Age: 38
End: 2023-07-10
Payer: COMMERCIAL

## 2023-07-10 VITALS
SYSTOLIC BLOOD PRESSURE: 102 MMHG | WEIGHT: 290 LBS | HEART RATE: 78 BPM | HEIGHT: 70 IN | DIASTOLIC BLOOD PRESSURE: 72 MMHG | BODY MASS INDEX: 41.52 KG/M2

## 2023-07-10 DIAGNOSIS — F90.2 ATTENTION DEFICIT HYPERACTIVITY DISORDER (ADHD), COMBINED TYPE: Primary | ICD-10-CM

## 2023-07-10 PROCEDURE — 1036F TOBACCO NON-USER: CPT | Performed by: STUDENT IN AN ORGANIZED HEALTH CARE EDUCATION/TRAINING PROGRAM

## 2023-07-10 PROCEDURE — G8427 DOCREV CUR MEDS BY ELIG CLIN: HCPCS | Performed by: STUDENT IN AN ORGANIZED HEALTH CARE EDUCATION/TRAINING PROGRAM

## 2023-07-10 PROCEDURE — G8417 CALC BMI ABV UP PARAM F/U: HCPCS | Performed by: STUDENT IN AN ORGANIZED HEALTH CARE EDUCATION/TRAINING PROGRAM

## 2023-07-10 PROCEDURE — 99204 OFFICE O/P NEW MOD 45 MIN: CPT | Performed by: STUDENT IN AN ORGANIZED HEALTH CARE EDUCATION/TRAINING PROGRAM

## 2023-07-10 RX ORDER — METHYLPHENIDATE HYDROCHLORIDE 10 MG/1
10 TABLET ORAL 2 TIMES DAILY
Qty: 60 TABLET | Refills: 0 | Status: SHIPPED | OUTPATIENT
Start: 2023-07-10 | End: 2023-08-07 | Stop reason: SDUPTHER

## 2023-07-10 ASSESSMENT — ANXIETY QUESTIONNAIRES
2. NOT BEING ABLE TO STOP OR CONTROL WORRYING: 0
6. BECOMING EASILY ANNOYED OR IRRITABLE: 1
1. FEELING NERVOUS, ANXIOUS, OR ON EDGE: 0
3. WORRYING TOO MUCH ABOUT DIFFERENT THINGS: 0
IF YOU CHECKED OFF ANY PROBLEMS ON THIS QUESTIONNAIRE, HOW DIFFICULT HAVE THESE PROBLEMS MADE IT FOR YOU TO DO YOUR WORK, TAKE CARE OF THINGS AT HOME, OR GET ALONG WITH OTHER PEOPLE: NOT DIFFICULT AT ALL
5. BEING SO RESTLESS THAT IT IS HARD TO SIT STILL: 1
4. TROUBLE RELAXING: 0
GAD7 TOTAL SCORE: 2
7. FEELING AFRAID AS IF SOMETHING AWFUL MIGHT HAPPEN: 0

## 2023-07-10 ASSESSMENT — PATIENT HEALTH QUESTIONNAIRE - PHQ9
4. FEELING TIRED OR HAVING LITTLE ENERGY: 0
1. LITTLE INTEREST OR PLEASURE IN DOING THINGS: 0
SUM OF ALL RESPONSES TO PHQ9 QUESTIONS 1 & 2: 0
8. MOVING OR SPEAKING SO SLOWLY THAT OTHER PEOPLE COULD HAVE NOTICED. OR THE OPPOSITE, BEING SO FIGETY OR RESTLESS THAT YOU HAVE BEEN MOVING AROUND A LOT MORE THAN USUAL: 1
SUM OF ALL RESPONSES TO PHQ QUESTIONS 1-9: 7
9. THOUGHTS THAT YOU WOULD BE BETTER OFF DEAD, OR OF HURTING YOURSELF: 0
SUM OF ALL RESPONSES TO PHQ QUESTIONS 1-9: 7
3. TROUBLE FALLING OR STAYING ASLEEP: 1
5. POOR APPETITE OR OVEREATING: 2
SUM OF ALL RESPONSES TO PHQ QUESTIONS 1-9: 7
7. TROUBLE CONCENTRATING ON THINGS, SUCH AS READING THE NEWSPAPER OR WATCHING TELEVISION: 2
2. FEELING DOWN, DEPRESSED OR HOPELESS: 0
SUM OF ALL RESPONSES TO PHQ QUESTIONS 1-9: 7
10. IF YOU CHECKED OFF ANY PROBLEMS, HOW DIFFICULT HAVE THESE PROBLEMS MADE IT FOR YOU TO DO YOUR WORK, TAKE CARE OF THINGS AT HOME, OR GET ALONG WITH OTHER PEOPLE: 1
6. FEELING BAD ABOUT YOURSELF - OR THAT YOU ARE A FAILURE OR HAVE LET YOURSELF OR YOUR FAMILY DOWN: 1

## 2023-08-07 DIAGNOSIS — F90.2 ATTENTION DEFICIT HYPERACTIVITY DISORDER (ADHD), COMBINED TYPE: ICD-10-CM

## 2023-08-07 RX ORDER — METHYLPHENIDATE HYDROCHLORIDE 10 MG/1
10 TABLET ORAL 2 TIMES DAILY
Qty: 60 TABLET | Refills: 0 | Status: SHIPPED | OUTPATIENT
Start: 2023-08-07 | End: 2023-09-06

## 2023-08-16 ASSESSMENT — ENCOUNTER SYMPTOMS
GASTROINTESTINAL NEGATIVE: 1
RESPIRATORY NEGATIVE: 1
ALLERGIC/IMMUNOLOGIC NEGATIVE: 1
EYES NEGATIVE: 1

## 2023-08-17 ENCOUNTER — OFFICE VISIT (OUTPATIENT)
Dept: PSYCHIATRY | Age: 38
End: 2023-08-17
Payer: COMMERCIAL

## 2023-08-17 VITALS
BODY MASS INDEX: 41.09 KG/M2 | SYSTOLIC BLOOD PRESSURE: 128 MMHG | WEIGHT: 287 LBS | DIASTOLIC BLOOD PRESSURE: 80 MMHG | HEIGHT: 70 IN | HEART RATE: 80 BPM

## 2023-08-17 DIAGNOSIS — F90.2 ATTENTION DEFICIT HYPERACTIVITY DISORDER (ADHD), COMBINED TYPE: Primary | ICD-10-CM

## 2023-08-17 PROCEDURE — 99214 OFFICE O/P EST MOD 30 MIN: CPT | Performed by: STUDENT IN AN ORGANIZED HEALTH CARE EDUCATION/TRAINING PROGRAM

## 2023-08-17 PROCEDURE — G8417 CALC BMI ABV UP PARAM F/U: HCPCS | Performed by: STUDENT IN AN ORGANIZED HEALTH CARE EDUCATION/TRAINING PROGRAM

## 2023-08-17 PROCEDURE — 1036F TOBACCO NON-USER: CPT | Performed by: STUDENT IN AN ORGANIZED HEALTH CARE EDUCATION/TRAINING PROGRAM

## 2023-08-17 PROCEDURE — G8427 DOCREV CUR MEDS BY ELIG CLIN: HCPCS | Performed by: STUDENT IN AN ORGANIZED HEALTH CARE EDUCATION/TRAINING PROGRAM

## 2023-08-17 RX ORDER — METHYLPHENIDATE HYDROCHLORIDE 10 MG/1
10 TABLET ORAL 2 TIMES DAILY
Qty: 60 TABLET | Refills: 0 | Status: SHIPPED | OUTPATIENT
Start: 2023-09-04 | End: 2023-10-04

## 2023-08-17 RX ORDER — METHYLPHENIDATE HYDROCHLORIDE 10 MG/1
10 TABLET ORAL 2 TIMES DAILY
Qty: 60 TABLET | Refills: 0 | Status: SHIPPED | OUTPATIENT
Start: 2023-10-02 | End: 2023-11-01

## 2023-08-17 ASSESSMENT — PATIENT HEALTH QUESTIONNAIRE - PHQ9
1. LITTLE INTEREST OR PLEASURE IN DOING THINGS: 0
8. MOVING OR SPEAKING SO SLOWLY THAT OTHER PEOPLE COULD HAVE NOTICED. OR THE OPPOSITE, BEING SO FIGETY OR RESTLESS THAT YOU HAVE BEEN MOVING AROUND A LOT MORE THAN USUAL: 0
SUM OF ALL RESPONSES TO PHQ QUESTIONS 1-9: 5
4. FEELING TIRED OR HAVING LITTLE ENERGY: 1
3. TROUBLE FALLING OR STAYING ASLEEP: 0
2. FEELING DOWN, DEPRESSED OR HOPELESS: 1
9. THOUGHTS THAT YOU WOULD BE BETTER OFF DEAD, OR OF HURTING YOURSELF: 0
6. FEELING BAD ABOUT YOURSELF - OR THAT YOU ARE A FAILURE OR HAVE LET YOURSELF OR YOUR FAMILY DOWN: 1
SUM OF ALL RESPONSES TO PHQ QUESTIONS 1-9: 5
5. POOR APPETITE OR OVEREATING: 2
7. TROUBLE CONCENTRATING ON THINGS, SUCH AS READING THE NEWSPAPER OR WATCHING TELEVISION: 0
SUM OF ALL RESPONSES TO PHQ9 QUESTIONS 1 & 2: 1
SUM OF ALL RESPONSES TO PHQ QUESTIONS 1-9: 5
SUM OF ALL RESPONSES TO PHQ QUESTIONS 1-9: 5

## 2023-08-17 ASSESSMENT — ANXIETY QUESTIONNAIRES
1. FEELING NERVOUS, ANXIOUS, OR ON EDGE: 0
2. NOT BEING ABLE TO STOP OR CONTROL WORRYING: 1
4. TROUBLE RELAXING: 1
7. FEELING AFRAID AS IF SOMETHING AWFUL MIGHT HAPPEN: 0
GAD7 TOTAL SCORE: 4
3. WORRYING TOO MUCH ABOUT DIFFERENT THINGS: 1
6. BECOMING EASILY ANNOYED OR IRRITABLE: 1
5. BEING SO RESTLESS THAT IT IS HARD TO SIT STILL: 0

## 2023-08-17 ASSESSMENT — ENCOUNTER SYMPTOMS
ALLERGIC/IMMUNOLOGIC NEGATIVE: 1
EYES NEGATIVE: 1
GASTROINTESTINAL NEGATIVE: 1
RESPIRATORY NEGATIVE: 1

## 2023-08-17 NOTE — PROGRESS NOTES
Patient is here for a follow up in person today and states that she is doing ok.      PHQ:  IGOR:    Previous Scores from  PHQ:  IGOR:
take care of things at home, or get along with other people?  - Not difficult at all        Labs:     Orders Only on 05/19/2023   Component Date Value Ref Range Status    WBC 05/19/2023 5.0  4.0 - 11.0 K/uL Final    RBC 05/19/2023 4.11  4.00 - 5.20 M/uL Final    Hemoglobin 05/19/2023 12.4  12.0 - 16.0 g/dL Final    Hematocrit 05/19/2023 35.8 (L)  36.0 - 48.0 % Final    MCV 05/19/2023 87.3  80.0 - 100.0 fL Final    MCH 05/19/2023 30.3  26.0 - 34.0 pg Final    MCHC 05/19/2023 34.7  31.0 - 36.0 g/dL Final    RDW 05/19/2023 13.9  12.4 - 15.4 % Final    Platelets 48/43/2641 174  135 - 450 K/uL Final    MPV 05/19/2023 8.7  5.0 - 10.5 fL Final    Neutrophils % 05/19/2023 56.0  % Final    Lymphocytes % 05/19/2023 34.3  % Final    Monocytes % 05/19/2023 7.5  % Final    Eosinophils % 05/19/2023 1.9  % Final    Basophils % 05/19/2023 0.3  % Final    Neutrophils Absolute 05/19/2023 2.8  1.7 - 7.7 K/uL Final    Lymphocytes Absolute 05/19/2023 1.7  1.0 - 5.1 K/uL Final    Monocytes Absolute 05/19/2023 0.4  0.0 - 1.3 K/uL Final    Eosinophils Absolute 05/19/2023 0.1  0.0 - 0.6 K/uL Final    Basophils Absolute 05/19/2023 0.0  0.0 - 0.2 K/uL Final    TSH 05/19/2023 2.27  0.27 - 4.20 uIU/mL Final    Hemoglobin A1C 05/19/2023 4.8  See comment % Final    Comment: Comment:  Diagnosis of Diabetes: > or = 6.5%  Increased risk of diabetes (Prediabetes): 5.7-6.4%  Glycemic Control: Nonpregnant Adults: <7.0%                    Pregnant: <6.0%        eAG 05/19/2023 91.1  mg/dL Final    Sodium 05/19/2023 137  136 - 145 mmol/L Final    Potassium 05/19/2023 4.0  3.5 - 5.1 mmol/L Final    Chloride 05/19/2023 101  99 - 110 mmol/L Final    CO2 05/19/2023 24  21 - 32 mmol/L Final    Anion Gap 05/19/2023 12  3 - 16 Final    Glucose, Fasting 05/19/2023 80  70 - 99 mg/dL Final    BUN 05/19/2023 10  7 - 20 mg/dL Final    Creatinine 05/19/2023 0.8  0.6 - 1.1 mg/dL Final    Est, Glom Filt Rate 05/19/2023 >60  >60 Final    Comment: Pediatric calculator

## 2023-09-13 DIAGNOSIS — F33.42 RECURRENT MAJOR DEPRESSIVE DISORDER, IN FULL REMISSION (HCC): Primary | ICD-10-CM

## 2023-09-13 RX ORDER — BUPROPION HYDROCHLORIDE 150 MG/1
150 TABLET ORAL EVERY MORNING
Qty: 90 TABLET | Refills: 1 | Status: SHIPPED | OUTPATIENT
Start: 2023-09-13

## 2023-09-13 RX ORDER — BUPROPION HYDROCHLORIDE 300 MG/1
300 TABLET ORAL EVERY MORNING
Qty: 90 TABLET | Refills: 1 | Status: SHIPPED | OUTPATIENT
Start: 2023-09-13

## 2023-09-13 NOTE — TELEPHONE ENCOUNTER
Her mental health medications are currently managed by Dr. Rebekah Ken- I think that he has changed the dosing on this medication.

## 2023-09-22 DIAGNOSIS — F33.42 RECURRENT MAJOR DEPRESSIVE DISORDER, IN FULL REMISSION (HCC): ICD-10-CM

## 2023-09-25 RX ORDER — BUPROPION HYDROCHLORIDE 150 MG/1
TABLET ORAL
Qty: 60 TABLET | OUTPATIENT
Start: 2023-09-25

## 2023-10-19 ENCOUNTER — OFFICE VISIT (OUTPATIENT)
Dept: PSYCHIATRY | Age: 38
End: 2023-10-19
Payer: COMMERCIAL

## 2023-10-19 VITALS
HEART RATE: 82 BPM | DIASTOLIC BLOOD PRESSURE: 82 MMHG | BODY MASS INDEX: 41.95 KG/M2 | SYSTOLIC BLOOD PRESSURE: 122 MMHG | HEIGHT: 70 IN | WEIGHT: 293 LBS

## 2023-10-19 DIAGNOSIS — F43.23 ADJUSTMENT REACTION WITH ANXIETY AND DEPRESSION: ICD-10-CM

## 2023-10-19 DIAGNOSIS — F90.2 ATTENTION DEFICIT HYPERACTIVITY DISORDER (ADHD), COMBINED TYPE: Primary | ICD-10-CM

## 2023-10-19 PROCEDURE — 1036F TOBACCO NON-USER: CPT | Performed by: STUDENT IN AN ORGANIZED HEALTH CARE EDUCATION/TRAINING PROGRAM

## 2023-10-19 PROCEDURE — G8427 DOCREV CUR MEDS BY ELIG CLIN: HCPCS | Performed by: STUDENT IN AN ORGANIZED HEALTH CARE EDUCATION/TRAINING PROGRAM

## 2023-10-19 PROCEDURE — 99214 OFFICE O/P EST MOD 30 MIN: CPT | Performed by: STUDENT IN AN ORGANIZED HEALTH CARE EDUCATION/TRAINING PROGRAM

## 2023-10-19 PROCEDURE — G8484 FLU IMMUNIZE NO ADMIN: HCPCS | Performed by: STUDENT IN AN ORGANIZED HEALTH CARE EDUCATION/TRAINING PROGRAM

## 2023-10-19 PROCEDURE — G8417 CALC BMI ABV UP PARAM F/U: HCPCS | Performed by: STUDENT IN AN ORGANIZED HEALTH CARE EDUCATION/TRAINING PROGRAM

## 2023-10-19 RX ORDER — METHYLPHENIDATE HYDROCHLORIDE 10 MG/1
10 TABLET ORAL 2 TIMES DAILY
Qty: 60 TABLET | Refills: 0 | Status: SHIPPED | OUTPATIENT
Start: 2023-11-16 | End: 2023-12-16

## 2023-10-19 RX ORDER — METHYLPHENIDATE HYDROCHLORIDE 10 MG/1
10 TABLET ORAL 2 TIMES DAILY
Qty: 60 TABLET | Refills: 0 | Status: SHIPPED | OUTPATIENT
Start: 2023-10-19 | End: 2023-11-18

## 2023-10-19 ASSESSMENT — ANXIETY QUESTIONNAIRES
7. FEELING AFRAID AS IF SOMETHING AWFUL MIGHT HAPPEN: 0
3. WORRYING TOO MUCH ABOUT DIFFERENT THINGS: 1
IF YOU CHECKED OFF ANY PROBLEMS ON THIS QUESTIONNAIRE, HOW DIFFICULT HAVE THESE PROBLEMS MADE IT FOR YOU TO DO YOUR WORK, TAKE CARE OF THINGS AT HOME, OR GET ALONG WITH OTHER PEOPLE: SOMEWHAT DIFFICULT
GAD7 TOTAL SCORE: 5
1. FEELING NERVOUS, ANXIOUS, OR ON EDGE: 1
6. BECOMING EASILY ANNOYED OR IRRITABLE: 0
5. BEING SO RESTLESS THAT IT IS HARD TO SIT STILL: 1
4. TROUBLE RELAXING: 1
2. NOT BEING ABLE TO STOP OR CONTROL WORRYING: 1

## 2023-10-19 ASSESSMENT — PATIENT HEALTH QUESTIONNAIRE - PHQ9
3. TROUBLE FALLING OR STAYING ASLEEP: 0
2. FEELING DOWN, DEPRESSED OR HOPELESS: 1
SUM OF ALL RESPONSES TO PHQ QUESTIONS 1-9: 8
4. FEELING TIRED OR HAVING LITTLE ENERGY: 0
SUM OF ALL RESPONSES TO PHQ9 QUESTIONS 1 & 2: 2
9. THOUGHTS THAT YOU WOULD BE BETTER OFF DEAD, OR OF HURTING YOURSELF: 0
10. IF YOU CHECKED OFF ANY PROBLEMS, HOW DIFFICULT HAVE THESE PROBLEMS MADE IT FOR YOU TO DO YOUR WORK, TAKE CARE OF THINGS AT HOME, OR GET ALONG WITH OTHER PEOPLE: 1
8. MOVING OR SPEAKING SO SLOWLY THAT OTHER PEOPLE COULD HAVE NOTICED. OR THE OPPOSITE, BEING SO FIGETY OR RESTLESS THAT YOU HAVE BEEN MOVING AROUND A LOT MORE THAN USUAL: 1
7. TROUBLE CONCENTRATING ON THINGS, SUCH AS READING THE NEWSPAPER OR WATCHING TELEVISION: 2
SUM OF ALL RESPONSES TO PHQ QUESTIONS 1-9: 8
5. POOR APPETITE OR OVEREATING: 2
1. LITTLE INTEREST OR PLEASURE IN DOING THINGS: 1
6. FEELING BAD ABOUT YOURSELF - OR THAT YOU ARE A FAILURE OR HAVE LET YOURSELF OR YOUR FAMILY DOWN: 1

## 2023-10-19 NOTE — PROGRESS NOTES
Patient presents for an OV.
Refilled per Dr. Smallwood's protocol    
noted that she's off caffeine because she doesn't need it. Patient recently returned from vacation so she's been overeating a bit more lately. She looks to return to better habits shortly. The patient denied any SI/HI/AVH on evaluation. ROS:  Review of Systems   Constitutional:  Positive for appetite change. Psychiatric/Behavioral:  Positive for decreased concentration and dysphoric mood. The patient is nervous/anxious.          Brief Medical Hx:   Patient Active Problem List   Diagnosis    Psoriatic arthritis ([address])    Depression    Anal fissure    Spider telangiectasis    PCOS (polycystic ovarian syndrome)    Fatty liver disease, nonalcoholic    Chronic sinusitis    Morbid obesity with BMI of 40.0-44.9, adult (HCC)    Hyperlipidemia    Herniated lumbar intervertebral disc    Immunocompromised state ([address])    Seasonal allergic rhinitis    Vestibular migraine    Cyst of pineal gland    Mild intermittent asthma    Bilateral leg edema    Chronic bilateral thoracic back pain    Neck pain    Paresthesia of lower limb    Difficulty concentrating        Brief Psych Hx:  Hosp: Denied  Diagnoses: ADD  Currrent medications:  Bupropion xl 150-300mg daily  Med trials: sertraline (weight gain)  Outpt: 20+ years since seen a psychiatrist, was in and out of counseling as early as age 3  NSSI: Denied  Suicide Attempts: Denied    O:  Wt Readings from Last 3 Encounters:   10/19/23 134.3 kg (296 lb)   08/17/23 130.2 kg (287 lb)   07/10/23 131.5 kg (290 lb)       Vitals:    10/19/23 1305   BP: 122/82   Pulse: 82   Weight: 134.3 kg (296 lb)   Height: 1.778 m (5' 10\")       Mental Status Exam:   Appearance:    Appropriately dressed  Motor: Minimal fidgeting noted  Eye Contact: Good  Speech:    Appropriate rate and rhythm  Language:   Appropriate diction  Mood/Affect:  \" Better in the last hour\"/full range  Thought Process:   Linear  Thought Content:    Topic-appropriate, no SI/HI  Hallucinations:   Denied, not seem to be

## 2023-11-16 PROBLEM — F90.2 ATTENTION DEFICIT HYPERACTIVITY DISORDER (ADHD), COMBINED TYPE: Status: ACTIVE | Noted: 2023-05-19

## 2023-11-17 ENCOUNTER — OFFICE VISIT (OUTPATIENT)
Dept: INTERNAL MEDICINE CLINIC | Age: 38
End: 2023-11-17
Payer: COMMERCIAL

## 2023-11-17 VITALS
OXYGEN SATURATION: 99 % | BODY MASS INDEX: 43.36 KG/M2 | WEIGHT: 293 LBS | DIASTOLIC BLOOD PRESSURE: 78 MMHG | HEART RATE: 80 BPM | SYSTOLIC BLOOD PRESSURE: 110 MMHG

## 2023-11-17 DIAGNOSIS — K92.1 HEMATOCHEZIA: ICD-10-CM

## 2023-11-17 DIAGNOSIS — K76.0 FATTY LIVER DISEASE, NONALCOHOLIC: ICD-10-CM

## 2023-11-17 DIAGNOSIS — Z11.59 NEED FOR HEPATITIS C SCREENING TEST: ICD-10-CM

## 2023-11-17 DIAGNOSIS — Z00.00 ANNUAL PHYSICAL EXAM: Primary | ICD-10-CM

## 2023-11-17 DIAGNOSIS — E78.00 PURE HYPERCHOLESTEROLEMIA: ICD-10-CM

## 2023-11-17 DIAGNOSIS — Z80.0 FH: COLON CANCER: ICD-10-CM

## 2023-11-17 DIAGNOSIS — Z11.4 SCREENING FOR HIV WITHOUT PRESENCE OF RISK FACTORS: ICD-10-CM

## 2023-11-17 DIAGNOSIS — E66.01 MORBID OBESITY WITH BMI OF 40.0-44.9, ADULT (HCC): ICD-10-CM

## 2023-11-17 PROCEDURE — 99396 PREV VISIT EST AGE 40-64: CPT | Performed by: INTERNAL MEDICINE

## 2023-11-17 PROCEDURE — G8484 FLU IMMUNIZE NO ADMIN: HCPCS | Performed by: INTERNAL MEDICINE

## 2023-11-17 ASSESSMENT — ENCOUNTER SYMPTOMS
ABDOMINAL PAIN: 1
ANAL BLEEDING: 1
RESPIRATORY NEGATIVE: 1
EYES NEGATIVE: 1

## 2023-11-17 NOTE — PROGRESS NOTES
Value Date     05/19/2023    K 4.0 05/19/2023    BUN 10 05/19/2023    CREATININE 0.8 05/19/2023    LABGLOM >60 05/19/2023    GFRAA >60 02/19/2021    CALCIUM 8.8 05/19/2023    VITD25 36 07/14/2011     Lab Results   Component Value Date    ALT 35 05/19/2023    AST 29 05/19/2023     Lab Results   Component Value Date    HGB 12.4 05/19/2023     Lab Results   Component Value Date    TSHREFLEX 2.27 05/19/2023    TSH 4.55 (H) 04/13/2017       Social History     Tobacco Use    Smoking status: Never    Smokeless tobacco: Never   Substance Use Topics    Alcohol use:  Yes     Alcohol/week: 4.0 standard drinks of alcohol     Types: 2 Glasses of wine, 2 Shots of liquor per week     Comment: social     Social History     Substance and Sexual Activity   Sexual Activity Not Currently       Immunization History   Administered Date(s) Administered    COVID-19, PFIZER PURPLE top, DILUTE for use, (age 15 y+), 30mcg/0.3mL 03/26/2021, 04/10/2021, 09/17/2021    Influenza 01/28/2011, 11/15/2011    Influenza Virus Vaccine 10/14/2014, 10/02/2023    Influenza, FLUARIX, FLULAVAL, Ana Brand (age 10 mo+) AND AFLURIA, (age 1 y+), PF, 0.5mL 10/13/2016, 10/24/2017, 01/18/2019, 11/08/2019    Influenza, FLUCELVAX, (age 10 mo+), MDCK, PF, 0.5mL 10/04/2022    PPD Test 07/12/2016    Pneumococcal, PCV-13, PREVNAR 13, (age 6w+), IM, 0.5mL 04/12/2016    Pneumococcal, PPSV23, PNEUMOVAX 23, (age 2y+), SC/IM, 0.5mL 09/04/2007, 10/13/2016    TDaP, ADACEL (age 6y-58y), BOOSTRIX (age 10y+), IM, 0.5mL 05/03/2019    Td, unspecified formulation 09/04/2007     Health Maintenance   Topic Date Due    Hepatitis B vaccine (1 of 3 - 3-dose series) Never done    HIV screen  Never done    Hepatitis C screen  Never done    COVID-19 Vaccine (4 - 2023-24 season) 09/01/2023    Depression Monitoring  10/19/2024    Cervical cancer screen  01/21/2025    Diabetes screen  05/19/2026    Lipids  05/19/2028    DTaP/Tdap/Td vaccine (7 - Td or Tdap) 05/03/2029    Pneumococcal 0-64

## 2023-11-18 NOTE — ASSESSMENT & PLAN NOTE
Worse due to recent external stressors, which are resolving. Patient counseled on diet and exercise.

## 2023-11-18 NOTE — ASSESSMENT & PLAN NOTE
Discussed importance of lifestyle changes to help prevent need for cholesterol medication in the future.

## 2023-11-18 NOTE — ASSESSMENT & PLAN NOTE
Asymptomatic. Recent LFTs normal per rheumatology labs at [facility]. Importance of low fat, low carbohydrate diet and weight loss stressed to help prevent progression. She will continue to minimize use of Tylenol, NSAIDs and alcohol.

## 2023-11-18 NOTE — ASSESSMENT & PLAN NOTE
Consistent with hemorrhoidal bleeding, which she has experienced in the past. Referred to GI for further evaluation.

## 2024-01-17 ENCOUNTER — OFFICE VISIT (OUTPATIENT)
Dept: GYNECOLOGY | Age: 39
End: 2024-01-17
Payer: COMMERCIAL

## 2024-01-17 VITALS
RESPIRATION RATE: 17 BRPM | HEIGHT: 70 IN | BODY MASS INDEX: 41.95 KG/M2 | OXYGEN SATURATION: 98 % | HEART RATE: 84 BPM | SYSTOLIC BLOOD PRESSURE: 128 MMHG | WEIGHT: 293 LBS | DIASTOLIC BLOOD PRESSURE: 76 MMHG

## 2024-01-17 DIAGNOSIS — Z01.419 WELL WOMAN EXAM WITH ROUTINE GYNECOLOGICAL EXAM: Primary | ICD-10-CM

## 2024-01-17 PROCEDURE — G8484 FLU IMMUNIZE NO ADMIN: HCPCS | Performed by: OBSTETRICS & GYNECOLOGY

## 2024-01-17 PROCEDURE — 99396 PREV VISIT EST AGE 40-64: CPT | Performed by: OBSTETRICS & GYNECOLOGY

## 2024-01-17 ASSESSMENT — ENCOUNTER SYMPTOMS
ALLERGIC/IMMUNOLOGIC NEGATIVE: 1
RESPIRATORY NEGATIVE: 1
GASTROINTESTINAL NEGATIVE: 1
EYES NEGATIVE: 1

## 2024-01-18 ENCOUNTER — PATIENT MESSAGE (OUTPATIENT)
Dept: INTERNAL MEDICINE CLINIC | Age: 39
End: 2024-01-18

## 2024-01-18 DIAGNOSIS — F90.2 ATTENTION DEFICIT HYPERACTIVITY DISORDER (ADHD), COMBINED TYPE: ICD-10-CM

## 2024-01-18 DIAGNOSIS — K92.1 HEMATOCHEZIA: ICD-10-CM

## 2024-01-18 DIAGNOSIS — F33.9 EPISODE OF RECURRENT MAJOR DEPRESSIVE DISORDER, UNSPECIFIED DEPRESSION EPISODE SEVERITY (HCC): Chronic | ICD-10-CM

## 2024-01-18 DIAGNOSIS — Z80.0 FH: COLON CANCER: ICD-10-CM

## 2024-01-18 DIAGNOSIS — L40.50 PSORIATIC ARTHRITIS (HCC): Primary | Chronic | ICD-10-CM

## 2024-01-22 NOTE — TELEPHONE ENCOUNTER
From: Charlotte Parra  To: Dr. 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 I am still with [facility] but unfortunately nothing is transferring over. There’s no rush but I need the referrals you’ve written in the past to be resubmitted when you get a chance. These would be for Dr. Tawanna Ferrer, Dr. Morelos, and for Dr. Guerin for the colonoscopy. I don’t have my next appointment with any of them until Jan 29 so any time next week would be wonderful. I also hear that you are joining a  practice in May. Very exciting. I can see that we have an appointment scheduled for that month. Could you also let me know if that’s still happening or if I need to find another PCP? Thanks  
Home

## 2024-01-24 NOTE — PROGRESS NOTES
TSH 05/19/2023 2.27  0.27 - 4.20 uIU/mL Final    Hemoglobin A1C 05/19/2023 4.8  See comment % Final    Comment: Comment:  Diagnosis of Diabetes: > or = 6.5%  Increased risk of diabetes (Prediabetes): 5.7-6.4%  Glycemic Control: Nonpregnant Adults: <7.0%                    Pregnant: <6.0%        Estimated Avg Glucose 05/19/2023 91.1  mg/dL Final    Sodium 05/19/2023 137  136 - 145 mmol/L Final    Potassium 05/19/2023 4.0  3.5 - 5.1 mmol/L Final    Chloride 05/19/2023 101  99 - 110 mmol/L Final    CO2 05/19/2023 24  21 - 32 mmol/L Final    Anion Gap 05/19/2023 12  3 - 16 Final    Glucose, Fasting 05/19/2023 80  70 - 99 mg/dL Final    BUN 05/19/2023 10  7 - 20 mg/dL Final    Creatinine 05/19/2023 0.8  0.6 - 1.1 mg/dL Final    Est, Glom Filt Rate 05/19/2023 >60  >60 Final    Comment: Pediatric calculator link  https://www.kidney.org/professionals/kdoqi/gfr_calculatorped  Effective Oct 3, 2022  These results are not intended for use in patients  <18 years of age.  eGFR results are calculated without  a race factor using the 2021 CKD-EPI equation.  Careful  clinical correlation is recommended, particularly when  comparing to results calculated using previous equations.  The CKD-EPI equation is less accurate in patients with  extremes of muscle mass, extra-renal metabolism of  creatinine, excessive creatinine ingestion, or following  therapy that affects renal tubular secretion.      Calcium 05/19/2023 8.8  8.3 - 10.6 mg/dL Final    Total Protein 05/19/2023 6.7  6.4 - 8.2 g/dL Final    Albumin 05/19/2023 4.2  3.4 - 5.0 g/dL Final    Albumin/Globulin Ratio 05/19/2023 1.7  1.1 - 2.2 Final    Total Bilirubin 05/19/2023 0.4  0.0 - 1.0 mg/dL Final    Alkaline Phosphatase 05/19/2023 59  40 - 129 U/L Final    ALT 05/19/2023 35  10 - 40 U/L Final    AST 05/19/2023 29  15 - 37 U/L Final    Cholesterol, Fasting 05/19/2023 169  0 - 199 mg/dL Final    Triglyceride, Fasting 05/19/2023 122  0 - 150 mg/dL Final    HDL 05/19/2023 44  40 -

## 2024-01-29 ENCOUNTER — OFFICE VISIT (OUTPATIENT)
Dept: PSYCHIATRY | Age: 39
End: 2024-01-29
Payer: COMMERCIAL

## 2024-01-29 VITALS
HEART RATE: 84 BPM | HEIGHT: 70 IN | OXYGEN SATURATION: 99 % | DIASTOLIC BLOOD PRESSURE: 92 MMHG | WEIGHT: 293 LBS | SYSTOLIC BLOOD PRESSURE: 112 MMHG | BODY MASS INDEX: 41.95 KG/M2

## 2024-01-29 DIAGNOSIS — F90.2 ATTENTION DEFICIT HYPERACTIVITY DISORDER (ADHD), COMBINED TYPE: ICD-10-CM

## 2024-01-29 PROCEDURE — 99214 OFFICE O/P EST MOD 30 MIN: CPT | Performed by: STUDENT IN AN ORGANIZED HEALTH CARE EDUCATION/TRAINING PROGRAM

## 2024-01-29 PROCEDURE — 1036F TOBACCO NON-USER: CPT | Performed by: STUDENT IN AN ORGANIZED HEALTH CARE EDUCATION/TRAINING PROGRAM

## 2024-01-29 PROCEDURE — G8427 DOCREV CUR MEDS BY ELIG CLIN: HCPCS | Performed by: STUDENT IN AN ORGANIZED HEALTH CARE EDUCATION/TRAINING PROGRAM

## 2024-01-29 PROCEDURE — G8484 FLU IMMUNIZE NO ADMIN: HCPCS | Performed by: STUDENT IN AN ORGANIZED HEALTH CARE EDUCATION/TRAINING PROGRAM

## 2024-01-29 PROCEDURE — G8417 CALC BMI ABV UP PARAM F/U: HCPCS | Performed by: STUDENT IN AN ORGANIZED HEALTH CARE EDUCATION/TRAINING PROGRAM

## 2024-01-29 RX ORDER — METHYLPHENIDATE HYDROCHLORIDE 20 MG/1
20 CAPSULE, EXTENDED RELEASE ORAL EVERY MORNING
Qty: 30 CAPSULE | Refills: 0 | Status: SHIPPED | OUTPATIENT
Start: 2024-01-29 | End: 2024-02-28

## 2024-01-29 ASSESSMENT — PATIENT HEALTH QUESTIONNAIRE - PHQ9
4. FEELING TIRED OR HAVING LITTLE ENERGY: 0
6. FEELING BAD ABOUT YOURSELF - OR THAT YOU ARE A FAILURE OR HAVE LET YOURSELF OR YOUR FAMILY DOWN: 1
2. FEELING DOWN, DEPRESSED OR HOPELESS: 2
SUM OF ALL RESPONSES TO PHQ QUESTIONS 1-9: 6
1. LITTLE INTEREST OR PLEASURE IN DOING THINGS: 1
9. THOUGHTS THAT YOU WOULD BE BETTER OFF DEAD, OR OF HURTING YOURSELF: 0
SUM OF ALL RESPONSES TO PHQ QUESTIONS 1-9: 6
SUM OF ALL RESPONSES TO PHQ9 QUESTIONS 1 & 2: 3
SUM OF ALL RESPONSES TO PHQ QUESTIONS 1-9: 6
10. IF YOU CHECKED OFF ANY PROBLEMS, HOW DIFFICULT HAVE THESE PROBLEMS MADE IT FOR YOU TO DO YOUR WORK, TAKE CARE OF THINGS AT HOME, OR GET ALONG WITH OTHER PEOPLE: 1
7. TROUBLE CONCENTRATING ON THINGS, SUCH AS READING THE NEWSPAPER OR WATCHING TELEVISION: 1
5. POOR APPETITE OR OVEREATING: 0
3. TROUBLE FALLING OR STAYING ASLEEP: 1
8. MOVING OR SPEAKING SO SLOWLY THAT OTHER PEOPLE COULD HAVE NOTICED. OR THE OPPOSITE, BEING SO FIGETY OR RESTLESS THAT YOU HAVE BEEN MOVING AROUND A LOT MORE THAN USUAL: 0
SUM OF ALL RESPONSES TO PHQ QUESTIONS 1-9: 6

## 2024-01-29 ASSESSMENT — ANXIETY QUESTIONNAIRES
3. WORRYING TOO MUCH ABOUT DIFFERENT THINGS: 1
5. BEING SO RESTLESS THAT IT IS HARD TO SIT STILL: 0
7. FEELING AFRAID AS IF SOMETHING AWFUL MIGHT HAPPEN: 0
GAD7 TOTAL SCORE: 3
IF YOU CHECKED OFF ANY PROBLEMS ON THIS QUESTIONNAIRE, HOW DIFFICULT HAVE THESE PROBLEMS MADE IT FOR YOU TO DO YOUR WORK, TAKE CARE OF THINGS AT HOME, OR GET ALONG WITH OTHER PEOPLE: SOMEWHAT DIFFICULT
4. TROUBLE RELAXING: 1
2. NOT BEING ABLE TO STOP OR CONTROL WORRYING: 0
1. FEELING NERVOUS, ANXIOUS, OR ON EDGE: 0
6. BECOMING EASILY ANNOYED OR IRRITABLE: 1

## 2024-01-29 ASSESSMENT — ENCOUNTER SYMPTOMS
EYES NEGATIVE: 1
ALLERGIC/IMMUNOLOGIC NEGATIVE: 1
GASTROINTESTINAL NEGATIVE: 1
RESPIRATORY NEGATIVE: 1

## 2024-03-02 NOTE — PROGRESS NOTES
"Pt stating he was going to leave.  C/o being cold and not wanting to wait for chest xray, states, "Dr doesn't want to give me antibiotics."  I encouraged pt to wait 5 minutes for chest xray.  "      Vagina: No signs of injury and foreign body. No vaginal discharge, erythema, tenderness, bleeding, lesions or prolapsed vaginal walls.      Cervix: No cervical motion tenderness, discharge, friability, lesion, erythema, cervical bleeding or eversion.      Uterus: Not deviated, not enlarged, not fixed, not tender and no uterine prolapse.       Adnexa:         Right: No mass, tenderness or fullness.          Left: No mass, tenderness or fullness.        Rectum: No anal fissure or external hemorrhoid.      Comments: Normal urethral meatus, normal urethra, nl bladder  Musculoskeletal:         General: No swelling, tenderness, deformity or signs of injury. Normal range of motion.      Cervical back: Normal range of motion and neck supple. No rigidity.   Lymphadenopathy:      Cervical: No cervical adenopathy.      Lower Body: No right inguinal adenopathy. No left inguinal adenopathy.   Skin:     General: Skin is warm and dry.      Coloration: Skin is not jaundiced or pale.      Findings: No bruising, erythema, lesion or rash.   Neurological:      General: No focal deficit present.      Mental Status: She is alert and oriented to person, place, and time. Mental status is at baseline.      Deep Tendon Reflexes: Reflexes are normal and symmetric.   Psychiatric:         Mood and Affect: Mood normal.         Behavior: Behavior normal.         Thought Content: Thought content normal.         Judgment: Judgment normal.         Assessment:      annual      Plan:      Pap, calcium, exercise, mammogram        Kristina Gunter MD

## 2024-03-06 NOTE — PROGRESS NOTES
PSYCHIATRY PROGRESS NOTE    Charlotte Parra  1985 03/11/2024  Face to Face time: 30 minutes  PCP: Ava Vila MD    CC:   Chief Complaint   Patient presents with    Follow-up       Patient is a 38 y.o. female with significant PMH of PCOS, psoriatic arthritis, HERRERA, hyperlipidemia, and migraines who presents to the outpatient psychiatric clinic today for evaluation and management of ADHD.      A:  Patient's presentation today is indicative of continued difficulties with some adjusting moods.  There is some concern for a possible hormonal relationship to her moods as well, with possibilities of PMDD or perimenopausal changes being present.  We'll continue to follow and provide ongoing support.    Diagnosis:  Adjustment reaction with anxiety and depression, resolving  ADHD, combined type    P:   No changes to current medications.  Continue methylphenidate LA 20mg daily, bupropion xl 450mg daily    Medication Monitoring:    - PDMP reviewed: Methylphenidate LA 20 mg daily 30-day supply last filled 2/26/2024.    Follow-up: 2 months    Safety: Pt was counseled on the potential for increased suicidal ideations and advised on potential options for dealing with these including hotlines, calling the office, or going to the nearest emergency room.      __________________________________________________________________________    S:   Patient noted that she was doing generally alright at the moment, but she was struggling in regard to the psoriatic arthritis medications. She last got her Enbrel last month but had to pay for it OOP because of insurance issues, but otherwise is looking at not having it going forward and being stuck with the illness difficulties.    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  She does feel like the medications are helping her to manage the stress.    The patient described some mood irregularities associated with her

## 2024-03-11 ENCOUNTER — OFFICE VISIT (OUTPATIENT)
Dept: PSYCHIATRY | Age: 39
End: 2024-03-11
Payer: COMMERCIAL

## 2024-03-11 VITALS
DIASTOLIC BLOOD PRESSURE: 82 MMHG | WEIGHT: 293 LBS | BODY MASS INDEX: 41.95 KG/M2 | SYSTOLIC BLOOD PRESSURE: 122 MMHG | HEIGHT: 70 IN

## 2024-03-11 DIAGNOSIS — F33.42 RECURRENT MAJOR DEPRESSIVE DISORDER, IN FULL REMISSION (HCC): ICD-10-CM

## 2024-03-11 DIAGNOSIS — F90.2 ATTENTION DEFICIT HYPERACTIVITY DISORDER (ADHD), COMBINED TYPE: Primary | ICD-10-CM

## 2024-03-11 PROCEDURE — G8427 DOCREV CUR MEDS BY ELIG CLIN: HCPCS | Performed by: STUDENT IN AN ORGANIZED HEALTH CARE EDUCATION/TRAINING PROGRAM

## 2024-03-11 PROCEDURE — 1036F TOBACCO NON-USER: CPT | Performed by: STUDENT IN AN ORGANIZED HEALTH CARE EDUCATION/TRAINING PROGRAM

## 2024-03-11 PROCEDURE — 99214 OFFICE O/P EST MOD 30 MIN: CPT | Performed by: STUDENT IN AN ORGANIZED HEALTH CARE EDUCATION/TRAINING PROGRAM

## 2024-03-11 PROCEDURE — G8417 CALC BMI ABV UP PARAM F/U: HCPCS | Performed by: STUDENT IN AN ORGANIZED HEALTH CARE EDUCATION/TRAINING PROGRAM

## 2024-03-11 PROCEDURE — G8484 FLU IMMUNIZE NO ADMIN: HCPCS | Performed by: STUDENT IN AN ORGANIZED HEALTH CARE EDUCATION/TRAINING PROGRAM

## 2024-03-11 RX ORDER — BUPROPION HYDROCHLORIDE 150 MG/1
450 TABLET ORAL EVERY MORNING
Qty: 90 TABLET | Refills: 2 | Status: SHIPPED | OUTPATIENT
Start: 2024-03-11

## 2024-03-11 RX ORDER — METHYLPHENIDATE HYDROCHLORIDE 20 MG/1
20 CAPSULE, EXTENDED RELEASE ORAL EVERY MORNING
Qty: 30 CAPSULE | Refills: 0 | Status: SHIPPED | OUTPATIENT
Start: 2024-03-25 | End: 2024-04-24

## 2024-03-11 ASSESSMENT — ANXIETY QUESTIONNAIRES
IF YOU CHECKED OFF ANY PROBLEMS ON THIS QUESTIONNAIRE, HOW DIFFICULT HAVE THESE PROBLEMS MADE IT FOR YOU TO DO YOUR WORK, TAKE CARE OF THINGS AT HOME, OR GET ALONG WITH OTHER PEOPLE: SOMEWHAT DIFFICULT
5. BEING SO RESTLESS THAT IT IS HARD TO SIT STILL: 1
GAD7 TOTAL SCORE: 4
3. WORRYING TOO MUCH ABOUT DIFFERENT THINGS: 1
4. TROUBLE RELAXING: 1
1. FEELING NERVOUS, ANXIOUS, OR ON EDGE: 1
7. FEELING AFRAID AS IF SOMETHING AWFUL MIGHT HAPPEN: 0
2. NOT BEING ABLE TO STOP OR CONTROL WORRYING: 0
6. BECOMING EASILY ANNOYED OR IRRITABLE: 0

## 2024-03-11 ASSESSMENT — PATIENT HEALTH QUESTIONNAIRE - PHQ9
4. FEELING TIRED OR HAVING LITTLE ENERGY: 0
SUM OF ALL RESPONSES TO PHQ9 QUESTIONS 1 & 2: 1
7. TROUBLE CONCENTRATING ON THINGS, SUCH AS READING THE NEWSPAPER OR WATCHING TELEVISION: 1
SUM OF ALL RESPONSES TO PHQ QUESTIONS 1-9: 6
2. FEELING DOWN, DEPRESSED OR HOPELESS: 1
1. LITTLE INTEREST OR PLEASURE IN DOING THINGS: 0
8. MOVING OR SPEAKING SO SLOWLY THAT OTHER PEOPLE COULD HAVE NOTICED. OR THE OPPOSITE, BEING SO FIGETY OR RESTLESS THAT YOU HAVE BEEN MOVING AROUND A LOT MORE THAN USUAL: 1
10. IF YOU CHECKED OFF ANY PROBLEMS, HOW DIFFICULT HAVE THESE PROBLEMS MADE IT FOR YOU TO DO YOUR WORK, TAKE CARE OF THINGS AT HOME, OR GET ALONG WITH OTHER PEOPLE: 1
SUM OF ALL RESPONSES TO PHQ QUESTIONS 1-9: 6
9. THOUGHTS THAT YOU WOULD BE BETTER OFF DEAD, OR OF HURTING YOURSELF: 0
5. POOR APPETITE OR OVEREATING: 1
SUM OF ALL RESPONSES TO PHQ QUESTIONS 1-9: 6
6. FEELING BAD ABOUT YOURSELF - OR THAT YOU ARE A FAILURE OR HAVE LET YOURSELF OR YOUR FAMILY DOWN: 1
SUM OF ALL RESPONSES TO PHQ QUESTIONS 1-9: 6
3. TROUBLE FALLING OR STAYING ASLEEP: 1

## 2024-03-11 ASSESSMENT — ENCOUNTER SYMPTOMS
ALLERGIC/IMMUNOLOGIC NEGATIVE: 1
EYES NEGATIVE: 1
RESPIRATORY NEGATIVE: 1
GASTROINTESTINAL NEGATIVE: 1

## 2024-05-08 NOTE — PROGRESS NOTES
PSYCHIATRY PROGRESS NOTE    Charlotte Parra  1985 05/13/2024  Face to Face time: 20 minutes  PCP: Ava Vila MD    CC:   Chief Complaint   Patient presents with    Follow-up       Patient is a 38 y.o. female with significant PMH of PCOS, psoriatic arthritis, HERRERA, hyperlipidemia, and migraines who presents to the outpatient psychiatric clinic today for evaluation and management of ADHD.      A:  Patient's presentation today is indicative of continued stability of her underlying mental health conditions with the current medication regimen in place.  We will continue to follow and provide her with ongoing support.    Diagnosis:  ADHD, combined type    P:   1.  No changes to current medication regimen.  Patient will continue with methylphenidate LA 20 mg daily and bupropion  mg daily    Medication Monitoring:    - PDMP reviewed: Ritalin LA 20 mg daily 30-day supply last filled 4/5/2024.    Follow-up: 2 months    Safety: Pt was counseled on the potential for increased suicidal ideations and advised on potential options for dealing with these including hotlines, calling the office, or going to the nearest emergency room.      __________________________________________________________________________    S:   Patient identified that she has been doing pretty well.  She feels that the meds have her \"smooth\", notes that she ran out of the LA on Friday and ended up feeling that things were much worse off as a result.  She has been doing a better job of exercising, finds that this has been important for her mental health.  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  She is uncomfortably waiting on them at this point.    Patient denied any SI/HI/AVH on evaluation today.    ROS:  Review of Systems   Constitutional: Negative.    HENT: Negative.     Eyes: Negative.    Respiratory:

## 2024-05-13 ENCOUNTER — OFFICE VISIT (OUTPATIENT)
Dept: PSYCHIATRY | Age: 39
End: 2024-05-13
Payer: COMMERCIAL

## 2024-05-13 VITALS
DIASTOLIC BLOOD PRESSURE: 80 MMHG | HEIGHT: 70 IN | BODY MASS INDEX: 41.95 KG/M2 | SYSTOLIC BLOOD PRESSURE: 132 MMHG | WEIGHT: 293 LBS

## 2024-05-13 DIAGNOSIS — F90.2 ATTENTION DEFICIT HYPERACTIVITY DISORDER (ADHD), COMBINED TYPE: ICD-10-CM

## 2024-05-13 PROCEDURE — G8427 DOCREV CUR MEDS BY ELIG CLIN: HCPCS | Performed by: STUDENT IN AN ORGANIZED HEALTH CARE EDUCATION/TRAINING PROGRAM

## 2024-05-13 PROCEDURE — 99213 OFFICE O/P EST LOW 20 MIN: CPT | Performed by: STUDENT IN AN ORGANIZED HEALTH CARE EDUCATION/TRAINING PROGRAM

## 2024-05-13 PROCEDURE — G8417 CALC BMI ABV UP PARAM F/U: HCPCS | Performed by: STUDENT IN AN ORGANIZED HEALTH CARE EDUCATION/TRAINING PROGRAM

## 2024-05-13 PROCEDURE — 1036F TOBACCO NON-USER: CPT | Performed by: STUDENT IN AN ORGANIZED HEALTH CARE EDUCATION/TRAINING PROGRAM

## 2024-05-13 RX ORDER — METHYLPHENIDATE HYDROCHLORIDE 20 MG/1
20 CAPSULE, EXTENDED RELEASE ORAL EVERY MORNING
Qty: 30 CAPSULE | Refills: 0 | Status: SHIPPED | OUTPATIENT
Start: 2024-05-13 | End: 2024-06-12

## 2024-05-13 RX ORDER — METHYLPHENIDATE HYDROCHLORIDE 20 MG/1
20 CAPSULE, EXTENDED RELEASE ORAL EVERY MORNING
Qty: 30 CAPSULE | Refills: 0 | Status: SHIPPED | OUTPATIENT
Start: 2024-06-10 | End: 2024-07-10

## 2024-05-13 ASSESSMENT — PATIENT HEALTH QUESTIONNAIRE - PHQ9
3. TROUBLE FALLING OR STAYING ASLEEP: SEVERAL DAYS
9. THOUGHTS THAT YOU WOULD BE BETTER OFF DEAD, OR OF HURTING YOURSELF: NOT AT ALL
SUM OF ALL RESPONSES TO PHQ QUESTIONS 1-9: 6
SUM OF ALL RESPONSES TO PHQ QUESTIONS 1-9: 6
2. FEELING DOWN, DEPRESSED OR HOPELESS: SEVERAL DAYS
5. POOR APPETITE OR OVEREATING: NOT AT ALL
7. TROUBLE CONCENTRATING ON THINGS, SUCH AS READING THE NEWSPAPER OR WATCHING TELEVISION: SEVERAL DAYS
SUM OF ALL RESPONSES TO PHQ QUESTIONS 1-9: 6
SUM OF ALL RESPONSES TO PHQ9 QUESTIONS 1 & 2: 2
SUM OF ALL RESPONSES TO PHQ QUESTIONS 1-9: 6
4. FEELING TIRED OR HAVING LITTLE ENERGY: NOT AT ALL
8. MOVING OR SPEAKING SO SLOWLY THAT OTHER PEOPLE COULD HAVE NOTICED. OR THE OPPOSITE, BEING SO FIGETY OR RESTLESS THAT YOU HAVE BEEN MOVING AROUND A LOT MORE THAN USUAL: SEVERAL DAYS
6. FEELING BAD ABOUT YOURSELF - OR THAT YOU ARE A FAILURE OR HAVE LET YOURSELF OR YOUR FAMILY DOWN: SEVERAL DAYS
1. LITTLE INTEREST OR PLEASURE IN DOING THINGS: SEVERAL DAYS
10. IF YOU CHECKED OFF ANY PROBLEMS, HOW DIFFICULT HAVE THESE PROBLEMS MADE IT FOR YOU TO DO YOUR WORK, TAKE CARE OF THINGS AT HOME, OR GET ALONG WITH OTHER PEOPLE: SOMEWHAT DIFFICULT

## 2024-05-13 ASSESSMENT — ANXIETY QUESTIONNAIRES
2. NOT BEING ABLE TO STOP OR CONTROL WORRYING: SEVERAL DAYS
4. TROUBLE RELAXING: NOT AT ALL
6. BECOMING EASILY ANNOYED OR IRRITABLE: NOT AT ALL
7. FEELING AFRAID AS IF SOMETHING AWFUL MIGHT HAPPEN: NOT AT ALL
3. WORRYING TOO MUCH ABOUT DIFFERENT THINGS: SEVERAL DAYS
GAD7 TOTAL SCORE: 2
IF YOU CHECKED OFF ANY PROBLEMS ON THIS QUESTIONNAIRE, HOW DIFFICULT HAVE THESE PROBLEMS MADE IT FOR YOU TO DO YOUR WORK, TAKE CARE OF THINGS AT HOME, OR GET ALONG WITH OTHER PEOPLE: NOT DIFFICULT AT ALL
5. BEING SO RESTLESS THAT IT IS HARD TO SIT STILL: NOT AT ALL
1. FEELING NERVOUS, ANXIOUS, OR ON EDGE: NOT AT ALL

## 2024-05-13 ASSESSMENT — ENCOUNTER SYMPTOMS
EYES NEGATIVE: 1
GASTROINTESTINAL NEGATIVE: 1
ALLERGIC/IMMUNOLOGIC NEGATIVE: 1

## 2024-06-23 DIAGNOSIS — F90.2 ATTENTION DEFICIT HYPERACTIVITY DISORDER (ADHD), COMBINED TYPE: ICD-10-CM

## 2024-06-24 RX ORDER — METHYLPHENIDATE HYDROCHLORIDE 20 MG/1
20 CAPSULE, EXTENDED RELEASE ORAL EVERY MORNING
Qty: 30 CAPSULE | Refills: 0 | Status: SHIPPED | OUTPATIENT
Start: 2024-06-24 | End: 2024-06-26 | Stop reason: SDUPTHER

## 2024-07-12 NOTE — PROGRESS NOTES
Labs:     Orders Only on 05/19/2023   Component Date Value Ref Range Status    WBC 05/19/2023 5.0  4.0 - 11.0 K/uL Final    RBC 05/19/2023 4.11  4.00 - 5.20 M/uL Final    Hemoglobin 05/19/2023 12.4  12.0 - 16.0 g/dL Final    Hematocrit 05/19/2023 35.8 (L)  36.0 - 48.0 % Final    MCV 05/19/2023 87.3  80.0 - 100.0 fL Final    MCH 05/19/2023 30.3  26.0 - 34.0 pg Final    MCHC 05/19/2023 34.7  31.0 - 36.0 g/dL Final    RDW 05/19/2023 13.9  12.4 - 15.4 % Final    Platelets 05/19/2023 174  135 - 450 K/uL Final    MPV 05/19/2023 8.7  5.0 - 10.5 fL Final    Neutrophils % 05/19/2023 56.0  % Final    Lymphocytes % 05/19/2023 34.3  % Final    Monocytes % 05/19/2023 7.5  % Final    Eosinophils % 05/19/2023 1.9  % Final    Basophils % 05/19/2023 0.3  % Final    Neutrophils Absolute 05/19/2023 2.8  1.7 - 7.7 K/uL Final    Lymphocytes Absolute 05/19/2023 1.7  1.0 - 5.1 K/uL Final    Monocytes Absolute 05/19/2023 0.4  0.0 - 1.3 K/uL Final    Eosinophils Absolute 05/19/2023 0.1  0.0 - 0.6 K/uL Final    Basophils Absolute 05/19/2023 0.0  0.0 - 0.2 K/uL Final    TSH 05/19/2023 2.27  0.27 - 4.20 uIU/mL Final    Hemoglobin A1C 05/19/2023 4.8  See comment % Final    Comment: Comment:  Diagnosis of Diabetes: > or = 6.5%  Increased risk of diabetes (Prediabetes): 5.7-6.4%  Glycemic Control: Nonpregnant Adults: <7.0%                    Pregnant: <6.0%        Estimated Avg Glucose 05/19/2023 91.1  mg/dL Final    Sodium 05/19/2023 137  136 - 145 mmol/L Final    Potassium 05/19/2023 4.0  3.5 - 5.1 mmol/L Final    Chloride 05/19/2023 101  99 - 110 mmol/L Final    CO2 05/19/2023 24  21 - 32 mmol/L Final    Anion Gap 05/19/2023 12  3 - 16 Final    Glucose, Fasting 05/19/2023 80  70 - 99 mg/dL Final    BUN 05/19/2023 10  7 - 20 mg/dL Final    Creatinine 05/19/2023 0.8  0.6 - 1.1 mg/dL Final    Est, Glom Filt Rate 05/19/2023 >60  >60 Final    Comment: Pediatric calculator

## 2024-07-15 ENCOUNTER — OFFICE VISIT (OUTPATIENT)
Dept: PSYCHIATRY | Age: 39
End: 2024-07-15
Payer: COMMERCIAL

## 2024-07-15 VITALS
BODY MASS INDEX: 41.95 KG/M2 | WEIGHT: 293 LBS | DIASTOLIC BLOOD PRESSURE: 82 MMHG | SYSTOLIC BLOOD PRESSURE: 122 MMHG | HEART RATE: 82 BPM | OXYGEN SATURATION: 98 % | HEIGHT: 70 IN

## 2024-07-15 DIAGNOSIS — F90.2 ATTENTION DEFICIT HYPERACTIVITY DISORDER (ADHD), COMBINED TYPE: Primary | ICD-10-CM

## 2024-07-15 DIAGNOSIS — F33.42 RECURRENT MAJOR DEPRESSIVE DISORDER, IN FULL REMISSION (HCC): ICD-10-CM

## 2024-07-15 PROCEDURE — G8417 CALC BMI ABV UP PARAM F/U: HCPCS | Performed by: STUDENT IN AN ORGANIZED HEALTH CARE EDUCATION/TRAINING PROGRAM

## 2024-07-15 PROCEDURE — G8427 DOCREV CUR MEDS BY ELIG CLIN: HCPCS | Performed by: STUDENT IN AN ORGANIZED HEALTH CARE EDUCATION/TRAINING PROGRAM

## 2024-07-15 PROCEDURE — 1036F TOBACCO NON-USER: CPT | Performed by: STUDENT IN AN ORGANIZED HEALTH CARE EDUCATION/TRAINING PROGRAM

## 2024-07-15 PROCEDURE — 99214 OFFICE O/P EST MOD 30 MIN: CPT | Performed by: STUDENT IN AN ORGANIZED HEALTH CARE EDUCATION/TRAINING PROGRAM

## 2024-07-15 RX ORDER — METHYLPHENIDATE HYDROCHLORIDE 20 MG/1
20 CAPSULE, EXTENDED RELEASE ORAL DAILY
Qty: 30 CAPSULE | Refills: 0 | Status: SHIPPED | OUTPATIENT
Start: 2024-09-18 | End: 2024-10-18

## 2024-07-15 RX ORDER — BUPROPION HYDROCHLORIDE 300 MG/1
300 TABLET ORAL EVERY MORNING
Qty: 30 TABLET | Refills: 4 | Status: SHIPPED | OUTPATIENT
Start: 2024-07-15

## 2024-07-15 RX ORDER — METHYLPHENIDATE HYDROCHLORIDE 20 MG/1
20 CAPSULE, EXTENDED RELEASE ORAL DAILY
Qty: 30 CAPSULE | Refills: 0 | Status: SHIPPED | OUTPATIENT
Start: 2024-08-21 | End: 2024-09-20

## 2024-07-15 RX ORDER — METHYLPHENIDATE HYDROCHLORIDE 20 MG/1
20 CAPSULE, EXTENDED RELEASE ORAL DAILY
Qty: 30 CAPSULE | Refills: 0 | Status: SHIPPED | OUTPATIENT
Start: 2024-07-24 | End: 2024-08-23

## 2024-07-15 ASSESSMENT — PATIENT HEALTH QUESTIONNAIRE - PHQ9
9. THOUGHTS THAT YOU WOULD BE BETTER OFF DEAD, OR OF HURTING YOURSELF: NOT AT ALL
7. TROUBLE CONCENTRATING ON THINGS, SUCH AS READING THE NEWSPAPER OR WATCHING TELEVISION: SEVERAL DAYS
8. MOVING OR SPEAKING SO SLOWLY THAT OTHER PEOPLE COULD HAVE NOTICED. OR THE OPPOSITE, BEING SO FIGETY OR RESTLESS THAT YOU HAVE BEEN MOVING AROUND A LOT MORE THAN USUAL: NOT AT ALL
3. TROUBLE FALLING OR STAYING ASLEEP: SEVERAL DAYS
SUM OF ALL RESPONSES TO PHQ9 QUESTIONS 1 & 2: 2
SUM OF ALL RESPONSES TO PHQ QUESTIONS 1-9: 8
SUM OF ALL RESPONSES TO PHQ QUESTIONS 1-9: 8
2. FEELING DOWN, DEPRESSED OR HOPELESS: SEVERAL DAYS
5. POOR APPETITE OR OVEREATING: MORE THAN HALF THE DAYS
6. FEELING BAD ABOUT YOURSELF - OR THAT YOU ARE A FAILURE OR HAVE LET YOURSELF OR YOUR FAMILY DOWN: SEVERAL DAYS
4. FEELING TIRED OR HAVING LITTLE ENERGY: SEVERAL DAYS
10. IF YOU CHECKED OFF ANY PROBLEMS, HOW DIFFICULT HAVE THESE PROBLEMS MADE IT FOR YOU TO DO YOUR WORK, TAKE CARE OF THINGS AT HOME, OR GET ALONG WITH OTHER PEOPLE: SOMEWHAT DIFFICULT
SUM OF ALL RESPONSES TO PHQ QUESTIONS 1-9: 8
SUM OF ALL RESPONSES TO PHQ QUESTIONS 1-9: 8
1. LITTLE INTEREST OR PLEASURE IN DOING THINGS: SEVERAL DAYS

## 2024-07-15 ASSESSMENT — ENCOUNTER SYMPTOMS
EYES NEGATIVE: 1
RESPIRATORY NEGATIVE: 1
GASTROINTESTINAL NEGATIVE: 1
ALLERGIC/IMMUNOLOGIC NEGATIVE: 1

## 2024-07-15 ASSESSMENT — ANXIETY QUESTIONNAIRES
1. FEELING NERVOUS, ANXIOUS, OR ON EDGE: NOT AT ALL
7. FEELING AFRAID AS IF SOMETHING AWFUL MIGHT HAPPEN: NOT AT ALL
5. BEING SO RESTLESS THAT IT IS HARD TO SIT STILL: NOT AT ALL
4. TROUBLE RELAXING: NOT AT ALL
3. WORRYING TOO MUCH ABOUT DIFFERENT THINGS: NOT AT ALL
IF YOU CHECKED OFF ANY PROBLEMS ON THIS QUESTIONNAIRE, HOW DIFFICULT HAVE THESE PROBLEMS MADE IT FOR YOU TO DO YOUR WORK, TAKE CARE OF THINGS AT HOME, OR GET ALONG WITH OTHER PEOPLE: NOT DIFFICULT AT ALL
6. BECOMING EASILY ANNOYED OR IRRITABLE: SEVERAL DAYS
2. NOT BEING ABLE TO STOP OR CONTROL WORRYING: NOT AT ALL
GAD7 TOTAL SCORE: 1

## 2025-01-21 ENCOUNTER — OFFICE VISIT (OUTPATIENT)
Dept: GYNECOLOGY | Age: 40
End: 2025-01-21

## 2025-01-21 VITALS
RESPIRATION RATE: 17 BRPM | OXYGEN SATURATION: 100 % | WEIGHT: 293 LBS | HEART RATE: 95 BPM | DIASTOLIC BLOOD PRESSURE: 80 MMHG | BODY MASS INDEX: 43.4 KG/M2 | SYSTOLIC BLOOD PRESSURE: 120 MMHG | HEIGHT: 69 IN

## 2025-01-21 DIAGNOSIS — E28.2 PCOS (POLYCYSTIC OVARIAN SYNDROME): ICD-10-CM

## 2025-01-21 DIAGNOSIS — N84.1 POLYP OF CERVIX: ICD-10-CM

## 2025-01-21 DIAGNOSIS — Z01.419 WELL WOMAN EXAM WITH ROUTINE GYNECOLOGICAL EXAM: Primary | ICD-10-CM

## 2025-01-22 NOTE — PROGRESS NOTES
Subjective   Patient ID: Charlotte Parra is a 39 y.o. female.    Patient is here for annual. Patient moving to [place]. Cycles better.     Gynecologic Exam        Review of Systems   Constitutional: Negative.    HENT: Negative.     Eyes: Negative.    Respiratory: Negative.     Cardiovascular: Negative.    Gastrointestinal: Negative.    Endocrine: Negative.    Genitourinary: Negative.    Musculoskeletal: Negative.    Skin: Negative.    Allergic/Immunologic: Negative.    Neurological: Negative.    Hematological: Negative.    Psychiatric/Behavioral: Negative.       Date of Birth 1985  Past Medical History:   Diagnosis Date    Depression     Dysmenorrhea     Herniated lumbar intervertebral disc     L3, L5-S1 with impingement    Hormone disorder     Hypothyroidism 2012    Mild intermittent asthma 5/3/2019    Polycystic ovary syndrome     Psoriatic arthritis (HCC)     Seasonal allergic rhinitis 10/13/2016    Thyroid disease      Past Surgical History:   Procedure Laterality Date    FOOT NEUROMA SURGERY      Right  x2    WISDOM TOOTH EXTRACTION       OB History    Para Term  AB Living   0 0 0 0 0 0   SAB IAB Ectopic Molar Multiple Live Births   0 0 0   0       Social History     Socioeconomic History    Marital status: Single     Spouse name: Not on file    Number of children: 0    Years of education: Not on file    Highest education level: Master's degree (e.g., MA, MS, Julieta, MEd, MSW, BALJIT)   Occupational History    Occupation: Estate sales   Tobacco Use    Smoking status: Never    Smokeless tobacco: Never   Vaping Use    Vaping status: Never Used   Substance and Sexual Activity    Alcohol use: Yes     Alcohol/week: 4.0 standard drinks of alcohol     Types: 2 Glasses of wine, 2 Shots of liquor per week     Comment: social    Drug use: No    Sexual activity: Not Currently   Other Topics Concern    Not on file   Social History Narrative    Patient noted that she lives with her parents